# Patient Record
Sex: FEMALE | Race: WHITE | NOT HISPANIC OR LATINO | Employment: OTHER | URBAN - METROPOLITAN AREA
[De-identification: names, ages, dates, MRNs, and addresses within clinical notes are randomized per-mention and may not be internally consistent; named-entity substitution may affect disease eponyms.]

---

## 2017-11-14 ENCOUNTER — HISTORICAL (OUTPATIENT)
Dept: RADIOLOGY | Facility: HOSPITAL | Age: 72
End: 2017-11-14

## 2017-12-01 ENCOUNTER — HISTORICAL (OUTPATIENT)
Dept: RADIOLOGY | Facility: HOSPITAL | Age: 72
End: 2017-12-01

## 2018-11-16 ENCOUNTER — HISTORICAL (OUTPATIENT)
Dept: RADIOLOGY | Facility: HOSPITAL | Age: 73
End: 2018-11-16

## 2020-06-25 ENCOUNTER — HISTORICAL (OUTPATIENT)
Dept: RADIOLOGY | Facility: HOSPITAL | Age: 75
End: 2020-06-25

## 2021-07-06 LAB
ABS NEUT (OLG): 9.4 X10(3)/MCL (ref 1.5–6.9)
APPEARANCE, UA: CLEAR
BACTERIA SPEC CULT: NORMAL /HPF
BILIRUB UR QL STRIP: NEGATIVE
BUN SERPL-MCNC: 36 MG/DL (ref 9.8–20.1)
CALCIUM SERPL-MCNC: 9.7 MG/DL (ref 8.4–10.2)
CHLORIDE SERPL-SCNC: 99 MMOL/L (ref 98–107)
CO2 SERPL-SCNC: 24 MMOL/L (ref 23–31)
COLOR UR: YELLOW
CREAT SERPL-MCNC: 0.99 MG/DL (ref 0.55–1.02)
CREAT/UREA NIT SERPL: 36
ERYTHROCYTE [DISTWIDTH] IN BLOOD BY AUTOMATED COUNT: 13.6 % (ref 11.5–17)
GLUCOSE (UA): NEGATIVE MG/DL
GLUCOSE SERPL-MCNC: 88 MG/DL (ref 82–115)
HCT VFR BLD AUTO: 38 % (ref 36–48)
HGB BLD-MCNC: 12.2 GM/DL (ref 12–16)
HGB UR QL STRIP: NEGATIVE
KETONES UR QL STRIP: NEGATIVE MG/DL
LEUKOCYTE ESTERASE UR QL STRIP: ABNORMAL
MCH RBC QN AUTO: 28 PG (ref 27–34)
MCHC RBC AUTO-ENTMCNC: 32 GM/DL (ref 31–36)
MCV RBC AUTO: 88 FL (ref 80–99)
NITRITE UR QL STRIP: NEGATIVE
PH UR STRIP: 6.5 [PH] (ref 4.6–8)
PLATELET # BLD AUTO: 371 X10(3)/MCL (ref 140–400)
PMV BLD AUTO: 10.7 FL (ref 6.8–10)
POTASSIUM SERPL-SCNC: 4.9 MMOL/L (ref 3.5–5.1)
PROT UR QL STRIP: NEGATIVE MG/DL
RBC # BLD AUTO: 4.31 X10(6)/MCL (ref 4.2–5.4)
RBC #/AREA URNS HPF: NORMAL /[HPF]
SODIUM SERPL-SCNC: 133 MMOL/L (ref 136–145)
SP GR UR STRIP: 1.01 (ref 1–1.03)
SQUAMOUS EPITHELIAL, UA: NORMAL /LPF
UROBILINOGEN UR STRIP-ACNC: 0.2 EU/DL
WBC # SPEC AUTO: 12.2 X10(3)/MCL (ref 4.5–11.5)
WBC #/AREA URNS HPF: NORMAL /HPF

## 2021-07-08 ENCOUNTER — HISTORICAL (OUTPATIENT)
Dept: ANESTHESIOLOGY | Facility: HOSPITAL | Age: 76
End: 2021-07-08

## 2021-08-09 ENCOUNTER — HISTORICAL (OUTPATIENT)
Dept: RADIOLOGY | Facility: HOSPITAL | Age: 76
End: 2021-08-09

## 2021-11-22 ENCOUNTER — HISTORICAL (OUTPATIENT)
Dept: RADIOLOGY | Facility: HOSPITAL | Age: 76
End: 2021-11-22

## 2021-12-20 ENCOUNTER — HISTORICAL (OUTPATIENT)
Dept: RADIOLOGY | Facility: HOSPITAL | Age: 76
End: 2021-12-20

## 2022-05-12 ENCOUNTER — HOSPITAL ENCOUNTER (OUTPATIENT)
Dept: RADIOLOGY | Facility: HOSPITAL | Age: 77
Discharge: HOME | End: 2022-05-12
Attending: NURSE PRACTITIONER
Payer: MEDICARE

## 2022-05-12 DIAGNOSIS — M79.671 RIGHT FOOT PAIN: ICD-10-CM

## 2022-05-12 PROCEDURE — 73630 X-RAY EXAM OF FOOT: CPT | Mod: TC,RT

## 2022-06-02 ENCOUNTER — HOSPITAL ENCOUNTER (OUTPATIENT)
Dept: RADIOLOGY | Facility: HOSPITAL | Age: 77
Discharge: HOME | End: 2022-06-02
Attending: SPECIALIST
Payer: MEDICARE

## 2022-06-02 DIAGNOSIS — M79.671 RIGHT FOOT PAIN: ICD-10-CM

## 2022-06-02 PROCEDURE — 73630 X-RAY EXAM OF FOOT: CPT | Mod: TC,RT

## 2022-11-03 DIAGNOSIS — Z12.31 ENCOUNTER FOR SCREENING MAMMOGRAM FOR MALIGNANT NEOPLASM OF BREAST: Primary | ICD-10-CM

## 2022-11-29 ENCOUNTER — HOSPITAL ENCOUNTER (OUTPATIENT)
Dept: RADIOLOGY | Facility: HOSPITAL | Age: 77
Discharge: HOME | End: 2022-11-29
Attending: NURSE PRACTITIONER
Payer: MEDICARE

## 2022-11-29 DIAGNOSIS — Z12.31 ENCOUNTER FOR SCREENING MAMMOGRAM FOR MALIGNANT NEOPLASM OF BREAST: ICD-10-CM

## 2022-11-29 PROCEDURE — 77063 BREAST TOMOSYNTHESIS BI: CPT | Mod: TC

## 2022-11-29 PROCEDURE — 77067 SCR MAMMO BI INCL CAD: CPT | Mod: 26,,, | Performed by: STUDENT IN AN ORGANIZED HEALTH CARE EDUCATION/TRAINING PROGRAM

## 2022-11-29 PROCEDURE — 77063 MAMMO DIGITAL SCREENING BILAT WITH TOMO: ICD-10-PCS | Mod: 26,,, | Performed by: STUDENT IN AN ORGANIZED HEALTH CARE EDUCATION/TRAINING PROGRAM

## 2022-11-29 PROCEDURE — 77067 MAMMO DIGITAL SCREENING BILAT WITH TOMO: ICD-10-PCS | Mod: 26,,, | Performed by: STUDENT IN AN ORGANIZED HEALTH CARE EDUCATION/TRAINING PROGRAM

## 2022-11-29 PROCEDURE — 77063 BREAST TOMOSYNTHESIS BI: CPT | Mod: 26,,, | Performed by: STUDENT IN AN ORGANIZED HEALTH CARE EDUCATION/TRAINING PROGRAM

## 2022-11-29 PROCEDURE — 77067 SCR MAMMO BI INCL CAD: CPT | Mod: TC

## 2023-01-10 DIAGNOSIS — Z80.3 FAMILY HISTORY OF BREAST CANCER: Primary | ICD-10-CM

## 2023-03-07 ENCOUNTER — OFFICE VISIT (OUTPATIENT)
Dept: HEMATOLOGY/ONCOLOGY | Facility: CLINIC | Age: 78
End: 2023-03-07
Payer: MEDICARE

## 2023-03-07 DIAGNOSIS — Z15.09 MONOALLELIC MUTATION OF CHEK2 GENE IN FEMALE PATIENT: Primary | ICD-10-CM

## 2023-03-07 DIAGNOSIS — Z15.01 MONOALLELIC MUTATION OF CHEK2 GENE IN FEMALE PATIENT: Primary | ICD-10-CM

## 2023-03-07 DIAGNOSIS — Z15.02 MONOALLELIC MUTATION OF CHEK2 GENE IN FEMALE PATIENT: Primary | ICD-10-CM

## 2023-03-07 DIAGNOSIS — Z15.89 MONOALLELIC MUTATION OF CHEK2 GENE IN FEMALE PATIENT: Primary | ICD-10-CM

## 2023-03-07 PROCEDURE — 99213 PR OFFICE/OUTPT VISIT, EST, LEVL III, 20-29 MIN: ICD-10-PCS | Mod: 95,,, | Performed by: NURSE PRACTITIONER

## 2023-03-07 PROCEDURE — 99213 OFFICE O/P EST LOW 20 MIN: CPT | Mod: 95,,, | Performed by: NURSE PRACTITIONER

## 2023-03-07 NOTE — PROGRESS NOTES
REFERRING PHYSICIAN: Self    Subjective:       Patient ID: Elvira Rivers is a 77 y.o. female.    Chief Complaint: No personal history of cancer but a family history of cancer and a known CHEK2 mutation in her sister, Katy Gonzales  53. Patient presented with her sister for genetic counseling when the sister's genetic test result was revealed. Ms. Rivers was found appropriate for genetic testing. She signed consent, lab was drawn and sent to [facility] for [facility] panel testing. She presented via Telemedicine Visit (audio and video) today for results disclosure.     HPI  No past medical history on file.     Review of patient's allergies indicates:  No Known Allergies     Review of Systems   Constitutional:  Negative for appetite change and unexpected weight change.   HENT:  Negative for mouth sores.    Eyes:  Negative for visual disturbance.   Respiratory:  Negative for cough and shortness of breath.    Cardiovascular:  Negative for chest pain.   Gastrointestinal:  Negative for abdominal pain and diarrhea.   Genitourinary:  Negative for frequency.   Musculoskeletal:  Negative for back pain.   Integumentary:  Negative for rash.   Neurological:  Negative for headaches.   Hematological:  Negative for adenopathy.   Psychiatric/Behavioral:  The patient is nervous/anxious.              Problem List Items Addressed This Visit    None  Visit Diagnoses       Monoallelic mutation of CHEK2 gene in female patient    -  Primary          Oncology History    No history exists.          Family History   Problem Relation Age of Onset    Osteoarthritis Mother     Heart failure Mother     Hypertension Mother     Arthritis Mother     Hearing loss Mother     Heart disease Mother     Miscarriages / Stillbirths Mother     Vision loss Mother         Macular Degeneration    Melanoma Mother 70    Prostate cancer Father 70        metastatic    Heart failure Father     Arthritis Father     Asthma Sister     Thyroid  disease Sister     Thyroid disease Sister     Breast cancer Sister 69    Genetic Disorder Sister         CHEK2 mutation    Throat cancer Brother 62        Squamous Cell Ca of tonsil    Hearing loss Brother     Heart disease Brother     Skin cancer Brother     Breast cancer Maternal Grandmother     Diabetes Maternal Grandmother     Heart failure Maternal Grandmother     Heart disease Maternal Grandmother     Leukemia Maternal Grandfather     Breast cancer Paternal Grandmother     Heart failure Paternal Grandmother     Cancer Paternal Grandmother     Heart disease Paternal Grandmother     Miscarriages / Stillbirths Paternal Grandmother     Heart failure Paternal Grandfather     Heart disease Paternal Grandfather     Hearing loss Maternal Uncle     Stroke Paternal Aunt     Hearing loss Paternal Aunt         Deaf mute    Hearing loss Paternal Uncle         Deaf mute    Hearing loss Paternal Uncle     Brain cancer Maternal Cousin 66        Assessment:     Genetic testing was appropriate for this patient because of her personal and family history. This CHEK2 specific site analysis indicated the heterozygous mutation genetic mutation: CHEK2 c.1100delC (p.C205Pog*15). This CHEK2 mutation is associated with increased lifetime risk of female breast cancer (20-40%) and colon cancer (5-10%).     The c.1100delc pathogenic mutation, located in coding exon 10 of the CHEK2 gene, results from a deletion of one nucleotide at position 1100, causing a translational frameshift with a predicted alternate stop codon. This alteration is located within the kinase domain, and is reported to abolish the kinase activity of CHEK2. This alteration is expected to result in loss of function by premature protein truncation or nonsense-mediated mRNA decay. As such, this alteration is interpreted as a disease-causing mutation.      The CHEK2 gene provides instructions for making checkpoint kinase 2 (CHEK2). This protein acts as a tumor suppressor  that regulates cell division by keeping cells from growing and dividing too rapidly or in an uncontrolled way. In response to DNA damage, the CHK2 protein interacts with several other proteins, including tumor protein 53 (produced from the TP53 gene) to keep cells with mutated or damaged DNA from dividing, thus preventing tumor formation.      The National Comprehensive Cancer Network (NCCN) has developed guidelines for management of cancer risks for CHEK2 mutation carriers. The options available to the patient were explained to her. The surveillance recommendation for risk of breast cancer is evaluation with mammogram annually with consideration for breast MRI annually. A reasonable schedule would be mammogram alternating breast MRI every 6 months, beginning at 30y or 5-10 years before the earliest case of breast cancer in the family.  For the risk of colon cancer, colonoscopy should begin at 40y or 10 years prior to the first case of colon cancer in the family, and repeated at least every 5 years.      Each first degree relative of Ms. Rivers has a 50% risk of having this same CHEK2 mutation. Family members can be tested using specific site analysis. Her sister was provided with a simple letter of explanation to assist with informing family members.    Daughter, Sharon WHITEHEAD 73 was present through the entire counseling session and has requested testing.     Per patient request a copy of this note and genetic test result will be forwarded to Dr. Olegario Christopher and Aruna Goncalves NP ([facility]).     A copy of her result and a simple letter of explanation to assist with informing family members will be emailed to her: [email or web address]       The patient location is: home    Visit type: audiovisual    Face to Face time with patient: 15 minutes  20 minutes of total time spent on the encounter, which includes face to face time and non-face to face time preparing to see the patient (eg, review of tests),  Obtaining and/or reviewing separately obtained history, Documenting clinical information in the electronic or other health record, Independently interpreting results (not separately reported) and communicating results to the patient/family/caregiver, or Care coordination (not separately reported).         Each patient to whom he or she provides medical services by telemedicine is:  (1) informed of the relationship between the physician and patient and the respective role of any other health care provider with respect to management of the patient; and (2) notified that he or she may decline to receive medical services by telemedicine and may withdraw from such care at any time.    PAMELA DUNAWAY, PhD

## 2023-03-09 ENCOUNTER — HOSPITAL ENCOUNTER (OUTPATIENT)
Dept: RADIOLOGY | Facility: HOSPITAL | Age: 78
Discharge: HOME | End: 2023-03-09
Attending: NURSE PRACTITIONER
Payer: MEDICARE

## 2023-03-09 DIAGNOSIS — M79.671 RIGHT FOOT PAIN: ICD-10-CM

## 2023-03-09 PROCEDURE — 73650 X-RAY EXAM OF HEEL: CPT | Mod: TC,RT

## 2023-03-29 DIAGNOSIS — N18.31 CHRONIC KIDNEY DISEASE, STAGE 3A: Primary | ICD-10-CM

## 2023-04-10 ENCOUNTER — HOSPITAL ENCOUNTER (OUTPATIENT)
Dept: RADIOLOGY | Facility: HOSPITAL | Age: 78
Discharge: HOME | End: 2023-04-10
Payer: MEDICARE

## 2023-04-10 DIAGNOSIS — N18.31 CHRONIC KIDNEY DISEASE, STAGE 3A: ICD-10-CM

## 2023-04-10 PROCEDURE — 76770 US EXAM ABDO BACK WALL COMP: CPT | Mod: TC

## 2023-09-26 ENCOUNTER — HOSPITAL ENCOUNTER (OUTPATIENT)
Dept: RADIOLOGY | Facility: HOSPITAL | Age: 78
Discharge: HOME | End: 2023-09-26
Attending: NURSE PRACTITIONER
Payer: MEDICARE

## 2023-09-26 DIAGNOSIS — M25.512 LEFT SHOULDER PAIN: ICD-10-CM

## 2023-09-26 PROCEDURE — 73030 X-RAY EXAM OF SHOULDER: CPT | Mod: TC,LT

## 2023-12-22 ENCOUNTER — HOSPITAL ENCOUNTER (OUTPATIENT)
Dept: RADIOLOGY | Facility: HOSPITAL | Age: 78
Discharge: HOME | End: 2023-12-22
Attending: NURSE PRACTITIONER
Payer: MEDICARE

## 2023-12-22 DIAGNOSIS — Z12.31 BREAST CANCER SCREENING BY MAMMOGRAM: ICD-10-CM

## 2023-12-22 PROCEDURE — 77067 SCR MAMMO BI INCL CAD: CPT | Mod: 26,,, | Performed by: STUDENT IN AN ORGANIZED HEALTH CARE EDUCATION/TRAINING PROGRAM

## 2023-12-22 PROCEDURE — 77063 BREAST TOMOSYNTHESIS BI: CPT | Mod: 26,,, | Performed by: STUDENT IN AN ORGANIZED HEALTH CARE EDUCATION/TRAINING PROGRAM

## 2023-12-22 PROCEDURE — 77067 SCR MAMMO BI INCL CAD: CPT | Mod: TC

## 2023-12-22 PROCEDURE — 77063 MAMMO DIGITAL SCREENING BILAT WITH TOMO: ICD-10-PCS | Mod: 26,,, | Performed by: STUDENT IN AN ORGANIZED HEALTH CARE EDUCATION/TRAINING PROGRAM

## 2023-12-22 PROCEDURE — 77067 MAMMO DIGITAL SCREENING BILAT WITH TOMO: ICD-10-PCS | Mod: 26,,, | Performed by: STUDENT IN AN ORGANIZED HEALTH CARE EDUCATION/TRAINING PROGRAM

## 2024-08-07 ENCOUNTER — HOSPITAL ENCOUNTER (OUTPATIENT)
Dept: RADIOLOGY | Facility: HOSPITAL | Age: 79
Discharge: HOME | End: 2024-08-07
Attending: NURSE PRACTITIONER
Payer: MEDICARE

## 2024-08-07 DIAGNOSIS — R09.3 ABNORMAL SPUTUM: ICD-10-CM

## 2024-08-07 DIAGNOSIS — R05.9 COUGH, UNSPECIFIED: ICD-10-CM

## 2024-08-07 PROCEDURE — 71046 X-RAY EXAM CHEST 2 VIEWS: CPT | Mod: TC

## 2024-08-14 DIAGNOSIS — R05.9 COUGH: ICD-10-CM

## 2024-08-14 DIAGNOSIS — R91.8 LUNG MASS: ICD-10-CM

## 2024-08-14 DIAGNOSIS — R09.3 ABNORMAL SPUTUM: Primary | ICD-10-CM

## 2024-08-22 ENCOUNTER — HOSPITAL ENCOUNTER (OUTPATIENT)
Dept: RADIOLOGY | Facility: HOSPITAL | Age: 79
Discharge: HOME | End: 2024-08-22
Attending: NURSE PRACTITIONER
Payer: MEDICARE

## 2024-08-22 DIAGNOSIS — R91.8 LUNG MASS: ICD-10-CM

## 2024-08-22 DIAGNOSIS — R09.3 ABNORMAL SPUTUM: ICD-10-CM

## 2024-08-22 DIAGNOSIS — R05.9 COUGH: ICD-10-CM

## 2024-08-22 PROCEDURE — 71250 CT THORAX DX C-: CPT | Mod: TC

## 2024-09-20 DIAGNOSIS — Z12.31 ENCOUNTER FOR SCREENING MAMMOGRAM FOR MALIGNANT NEOPLASM OF BREAST: Primary | ICD-10-CM

## 2024-11-17 ENCOUNTER — HOSPITAL ENCOUNTER (INPATIENT)
Facility: HOSPITAL | Age: 79
LOS: 4 days | Discharge: NURSING, REHAB OR LONG-TERM CARE | DRG: 481 | End: 2024-11-21
Attending: STUDENT IN AN ORGANIZED HEALTH CARE EDUCATION/TRAINING PROGRAM | Admitting: STUDENT IN AN ORGANIZED HEALTH CARE EDUCATION/TRAINING PROGRAM
Payer: MEDICARE

## 2024-11-17 DIAGNOSIS — Z01.811 PRE-OP CHEST EXAM: ICD-10-CM

## 2024-11-17 DIAGNOSIS — S72.352A DISPLACED COMMINUTED FRACTURE OF SHAFT OF LEFT FEMUR, INITIAL ENCOUNTER FOR CLOSED FRACTURE: ICD-10-CM

## 2024-11-17 DIAGNOSIS — S72.302A CLOSED FRACTURE OF SHAFT OF LEFT FEMUR, UNSPECIFIED FRACTURE MORPHOLOGY, INITIAL ENCOUNTER: Primary | ICD-10-CM
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
## 2024-11-17 DIAGNOSIS — R52 PAIN: ICD-10-CM

## 2024-11-17 PROBLEM — S72.92XA CLOSED FRACTURE OF LEFT FEMUR: Status: ACTIVE | Noted: 2024-11-17

## 2024-11-17 LAB
ALBUMIN SERPL-MCNC: 4 G/DL (ref 3.4–4.8)
ALBUMIN/GLOB SERPL: 1.4 RATIO (ref 1.1–2)
ALP SERPL-CCNC: 56 UNIT/L (ref 40–150)
ALT SERPL-CCNC: 13 UNIT/L (ref 0–55)
ANION GAP SERPL CALC-SCNC: 12 MEQ/L
AST SERPL-CCNC: 17 UNIT/L (ref 5–34)
BASOPHILS # BLD AUTO: 0.09 X10(3)/MCL
BASOPHILS NFR BLD AUTO: 0.8 %
BILIRUB SERPL-MCNC: 0.3 MG/DL
BUN SERPL-MCNC: 23.3 MG/DL (ref 9.8–20.1)
CALCIUM SERPL-MCNC: 9.1 MG/DL (ref 8.4–10.2)
CHLORIDE SERPL-SCNC: 101 MMOL/L (ref 98–107)
CO2 SERPL-SCNC: 21 MMOL/L (ref 23–31)
CREAT SERPL-MCNC: 1.41 MG/DL (ref 0.55–1.02)
CREAT/UREA NIT SERPL: 17
EOSINOPHIL # BLD AUTO: 0.03 X10(3)/MCL (ref 0–0.9)
EOSINOPHIL NFR BLD AUTO: 0.3 %
ERYTHROCYTE [DISTWIDTH] IN BLOOD BY AUTOMATED COUNT: 13.1 % (ref 11.5–17)
GFR SERPLBLD CREATININE-BSD FMLA CKD-EPI: 38 ML/MIN/1.73/M2
GLOBULIN SER-MCNC: 2.9 GM/DL (ref 2.4–3.5)
GLUCOSE SERPL-MCNC: 114 MG/DL (ref 82–115)
HCT VFR BLD AUTO: 34.7 % (ref 37–47)
HGB BLD-MCNC: 11.8 G/DL (ref 12–16)
IMM GRANULOCYTES # BLD AUTO: 0.04 X10(3)/MCL (ref 0–0.04)
IMM GRANULOCYTES NFR BLD AUTO: 0.3 %
LYMPHOCYTES # BLD AUTO: 0.94 X10(3)/MCL (ref 0.6–4.6)
LYMPHOCYTES NFR BLD AUTO: 8.1 %
MCH RBC QN AUTO: 29.9 PG (ref 27–31)
MCHC RBC AUTO-ENTMCNC: 34 G/DL (ref 33–36)
MCV RBC AUTO: 88.1 FL (ref 80–94)
MONOCYTES # BLD AUTO: 0.75 X10(3)/MCL (ref 0.1–1.3)
MONOCYTES NFR BLD AUTO: 6.5 %
NEUTROPHILS # BLD AUTO: 9.72 X10(3)/MCL (ref 2.1–9.2)
NEUTROPHILS NFR BLD AUTO: 84 %
NRBC BLD AUTO-RTO: 0 %
PLATELET # BLD AUTO: 299 X10(3)/MCL (ref 130–400)
PMV BLD AUTO: 9.8 FL (ref 7.4–10.4)
POTASSIUM SERPL-SCNC: 4.6 MMOL/L (ref 3.5–5.1)
PROT SERPL-MCNC: 6.9 GM/DL (ref 5.8–7.6)
RBC # BLD AUTO: 3.94 X10(6)/MCL (ref 4.2–5.4)
SODIUM SERPL-SCNC: 134 MMOL/L (ref 136–145)
WBC # BLD AUTO: 11.57 X10(3)/MCL (ref 4.5–11.5)

## 2024-11-17 PROCEDURE — 63600175 PHARM REV CODE 636 W HCPCS

## 2024-11-17 PROCEDURE — 85025 COMPLETE CBC W/AUTO DIFF WBC: CPT | Performed by: STUDENT IN AN ORGANIZED HEALTH CARE EDUCATION/TRAINING PROGRAM

## 2024-11-17 PROCEDURE — 11000001 HC ACUTE MED/SURG PRIVATE ROOM

## 2024-11-17 PROCEDURE — 63600175 PHARM REV CODE 636 W HCPCS: Performed by: STUDENT IN AN ORGANIZED HEALTH CARE EDUCATION/TRAINING PROGRAM

## 2024-11-17 PROCEDURE — 80053 COMPREHEN METABOLIC PANEL: CPT | Performed by: STUDENT IN AN ORGANIZED HEALTH CARE EDUCATION/TRAINING PROGRAM

## 2024-11-17 RX ORDER — ROPINIROLE 3 MG/1
1 TABLET, FILM COATED ORAL NIGHTLY
COMMUNITY
Start: 2024-09-24

## 2024-11-17 RX ORDER — LIDOCAINE HYDROCHLORIDE 10 MG/ML
1 INJECTION, SOLUTION EPIDURAL; INFILTRATION; INTRACAUDAL; PERINEURAL ONCE AS NEEDED
Status: DISCONTINUED | OUTPATIENT
Start: 2024-11-18 | End: 2024-11-21 | Stop reason: HOSPADM

## 2024-11-17 RX ORDER — SIMVASTATIN 20 MG/1
20 TABLET, FILM COATED ORAL
COMMUNITY

## 2024-11-17 RX ORDER — MORPHINE SULFATE 4 MG/ML
4 INJECTION, SOLUTION INTRAMUSCULAR; INTRAVENOUS
Status: COMPLETED | OUTPATIENT
Start: 2024-11-17 | End: 2024-11-17

## 2024-11-17 RX ORDER — ENOXAPARIN SODIUM 100 MG/ML
30 INJECTION SUBCUTANEOUS EVERY 24 HOURS
Status: DISCONTINUED | OUTPATIENT
Start: 2024-11-18 | End: 2024-11-19

## 2024-11-17 RX ORDER — OXYCODONE HYDROCHLORIDE 5 MG/1
5 TABLET ORAL EVERY 4 HOURS PRN
Status: DISCONTINUED | OUTPATIENT
Start: 2024-11-18 | End: 2024-11-21 | Stop reason: HOSPADM

## 2024-11-17 RX ORDER — SODIUM CHLORIDE, SODIUM LACTATE, POTASSIUM CHLORIDE, CALCIUM CHLORIDE 600; 310; 30; 20 MG/100ML; MG/100ML; MG/100ML; MG/100ML
INJECTION, SOLUTION INTRAVENOUS CONTINUOUS
Status: DISCONTINUED | OUTPATIENT
Start: 2024-11-18 | End: 2024-11-19

## 2024-11-17 RX ORDER — HYDROCHLOROTHIAZIDE 12.5 MG/1
12.5 TABLET ORAL DAILY
Status: DISCONTINUED | OUTPATIENT
Start: 2024-11-18 | End: 2024-11-21 | Stop reason: HOSPADM

## 2024-11-17 RX ORDER — MUPIROCIN 20 MG/G
OINTMENT TOPICAL 2 TIMES DAILY
Status: DISCONTINUED | OUTPATIENT
Start: 2024-11-19 | End: 2024-11-21 | Stop reason: HOSPADM

## 2024-11-17 RX ORDER — ACETAMINOPHEN 325 MG/1
650 TABLET ORAL EVERY 4 HOURS PRN
Status: DISCONTINUED | OUTPATIENT
Start: 2024-11-18 | End: 2024-11-19

## 2024-11-17 RX ORDER — ONDANSETRON HYDROCHLORIDE 2 MG/ML
INJECTION, SOLUTION INTRAVENOUS
Status: COMPLETED
Start: 2024-11-17 | End: 2024-11-17

## 2024-11-17 RX ORDER — MORPHINE SULFATE 4 MG/ML
INJECTION, SOLUTION INTRAMUSCULAR; INTRAVENOUS
Status: COMPLETED
Start: 2024-11-17 | End: 2024-11-17

## 2024-11-17 RX ORDER — LEVOTHYROXINE SODIUM 112 UG/1
112 TABLET ORAL DAILY
Status: DISCONTINUED | OUTPATIENT
Start: 2024-11-18 | End: 2024-11-21 | Stop reason: HOSPADM

## 2024-11-17 RX ORDER — ONDANSETRON 4 MG/1
8 TABLET, ORALLY DISINTEGRATING ORAL EVERY 8 HOURS PRN
Status: DISCONTINUED | OUTPATIENT
Start: 2024-11-18 | End: 2024-11-21 | Stop reason: HOSPADM

## 2024-11-17 RX ORDER — PROMETHAZINE HYDROCHLORIDE 25 MG/1
25 TABLET ORAL EVERY 6 HOURS PRN
Status: DISCONTINUED | OUTPATIENT
Start: 2024-11-18 | End: 2024-11-21 | Stop reason: HOSPADM

## 2024-11-17 RX ORDER — ONDANSETRON HYDROCHLORIDE 2 MG/ML
4 INJECTION, SOLUTION INTRAVENOUS
Status: COMPLETED | OUTPATIENT
Start: 2024-11-17 | End: 2024-11-17

## 2024-11-17 RX ORDER — SODIUM CHLORIDE 0.9 % (FLUSH) 0.9 %
10 SYRINGE (ML) INJECTION
Status: DISCONTINUED | OUTPATIENT
Start: 2024-11-18 | End: 2024-11-21 | Stop reason: HOSPADM

## 2024-11-17 RX ORDER — METHOCARBAMOL 100 MG/ML
500 INJECTION, SOLUTION INTRAMUSCULAR; INTRAVENOUS ONCE
Status: COMPLETED | OUTPATIENT
Start: 2024-11-17 | End: 2024-11-17

## 2024-11-17 RX ORDER — PROPOFOL 10 MG/ML
VIAL (ML) INTRAVENOUS CODE/TRAUMA/SEDATION MEDICATION
Status: COMPLETED | OUTPATIENT
Start: 2024-11-17 | End: 2024-11-17

## 2024-11-17 RX ORDER — MORPHINE SULFATE 4 MG/ML
4 INJECTION, SOLUTION INTRAMUSCULAR; INTRAVENOUS
Status: DISCONTINUED | OUTPATIENT
Start: 2024-11-17 | End: 2024-11-17

## 2024-11-17 RX ORDER — HYDROCHLOROTHIAZIDE 12.5 MG/1
12.5 TABLET ORAL
COMMUNITY
Start: 2024-10-23

## 2024-11-17 RX ORDER — OXYCODONE HYDROCHLORIDE 10 MG/1
10 TABLET ORAL EVERY 4 HOURS PRN
Status: DISCONTINUED | OUTPATIENT
Start: 2024-11-18 | End: 2024-11-21 | Stop reason: HOSPADM

## 2024-11-17 RX ORDER — ACETAMINOPHEN 325 MG/1
650 TABLET ORAL EVERY 8 HOURS PRN
Status: DISCONTINUED | OUTPATIENT
Start: 2024-11-18 | End: 2024-11-19

## 2024-11-17 RX ORDER — PROPOFOL 10 MG/ML
100 VIAL (ML) INTRAVENOUS ONCE
Status: DISCONTINUED | OUTPATIENT
Start: 2024-11-17 | End: 2024-11-18 | Stop reason: HOSPADM

## 2024-11-17 RX ORDER — TALC
6 POWDER (GRAM) TOPICAL NIGHTLY PRN
Status: DISCONTINUED | OUTPATIENT
Start: 2024-11-18 | End: 2024-11-21 | Stop reason: HOSPADM

## 2024-11-17 RX ORDER — ROPINIROLE 1 MG/1
3 TABLET, FILM COATED ORAL NIGHTLY
Status: DISCONTINUED | OUTPATIENT
Start: 2024-11-18 | End: 2024-11-18

## 2024-11-17 RX ORDER — LEVOTHYROXINE SODIUM 112 UG/1
112 TABLET ORAL
COMMUNITY

## 2024-11-17 RX ADMIN — MORPHINE SULFATE 4 MG: 4 INJECTION INTRAVENOUS at 08:11

## 2024-11-17 RX ADMIN — ONDANSETRON 4 MG: 2 INJECTION INTRAMUSCULAR; INTRAVENOUS at 10:11

## 2024-11-17 RX ADMIN — PROPOFOL 20 MG: 10 INJECTION, EMULSION INTRAVENOUS at 10:11

## 2024-11-17 RX ADMIN — PROPOFOL 10 MG: 10 INJECTION, EMULSION INTRAVENOUS at 10:11

## 2024-11-17 RX ADMIN — ONDANSETRON HYDROCHLORIDE 4 MG: 2 INJECTION, SOLUTION INTRAVENOUS at 10:11

## 2024-11-17 RX ADMIN — ONDANSETRON 4 MG: 2 INJECTION INTRAMUSCULAR; INTRAVENOUS at 08:11

## 2024-11-17 RX ADMIN — MORPHINE SULFATE 4 MG: 4 INJECTION INTRAVENOUS at 10:11

## 2024-11-17 RX ADMIN — METHOCARBAMOL 500 MG: 100 INJECTION INTRAMUSCULAR; INTRAVENOUS at 10:11

## 2024-11-17 RX ADMIN — MORPHINE SULFATE 4 MG: 4 INJECTION, SOLUTION INTRAMUSCULAR; INTRAVENOUS at 10:11

## 2024-11-18 ENCOUNTER — ANESTHESIA EVENT (OUTPATIENT)
Dept: SURGERY | Facility: HOSPITAL | Age: 79
End: 2024-11-18
Payer: MEDICARE

## 2024-11-18 ENCOUNTER — ANESTHESIA (OUTPATIENT)
Dept: SURGERY | Facility: HOSPITAL | Age: 79
End: 2024-11-18
Payer: MEDICARE

## 2024-11-18 LAB
ABORH RETYPE: NORMAL
ANION GAP SERPL CALC-SCNC: 6 MEQ/L
ANION GAP SERPL CALC-SCNC: 9 MEQ/L
BASOPHILS # BLD AUTO: 0.1 X10(3)/MCL
BASOPHILS NFR BLD AUTO: 0.9 %
BUN SERPL-MCNC: 15.4 MG/DL (ref 9.8–20.1)
BUN SERPL-MCNC: 16.8 MG/DL (ref 9.8–20.1)
CALCIUM SERPL-MCNC: 8.5 MG/DL (ref 8.4–10.2)
CALCIUM SERPL-MCNC: 8.6 MG/DL (ref 8.4–10.2)
CHLORIDE SERPL-SCNC: 102 MMOL/L (ref 98–107)
CHLORIDE SERPL-SCNC: 106 MMOL/L (ref 98–107)
CO2 SERPL-SCNC: 20 MMOL/L (ref 23–31)
CO2 SERPL-SCNC: 25 MMOL/L (ref 23–31)
CREAT SERPL-MCNC: 1.24 MG/DL (ref 0.55–1.02)
CREAT SERPL-MCNC: 1.27 MG/DL (ref 0.55–1.02)
CREAT/UREA NIT SERPL: 12
CREAT/UREA NIT SERPL: 13
EOSINOPHIL # BLD AUTO: 0.03 X10(3)/MCL (ref 0–0.9)
EOSINOPHIL NFR BLD AUTO: 0.3 %
ERYTHROCYTE [DISTWIDTH] IN BLOOD BY AUTOMATED COUNT: 13.3 % (ref 11.5–17)
GFR SERPLBLD CREATININE-BSD FMLA CKD-EPI: 43 ML/MIN/1.73/M2
GFR SERPLBLD CREATININE-BSD FMLA CKD-EPI: 44 ML/MIN/1.73/M2
GLUCOSE SERPL-MCNC: 162 MG/DL (ref 82–115)
GLUCOSE SERPL-MCNC: 97 MG/DL (ref 82–115)
GROUP & RH: NORMAL
HCT VFR BLD AUTO: 32.6 % (ref 37–47)
HGB BLD-MCNC: 10.9 G/DL (ref 12–16)
IMM GRANULOCYTES # BLD AUTO: 0.04 X10(3)/MCL (ref 0–0.04)
IMM GRANULOCYTES NFR BLD AUTO: 0.4 %
INDIRECT COOMBS: NORMAL
LYMPHOCYTES # BLD AUTO: 1.89 X10(3)/MCL (ref 0.6–4.6)
LYMPHOCYTES NFR BLD AUTO: 17.5 %
MAGNESIUM SERPL-MCNC: 2.2 MG/DL (ref 1.6–2.6)
MCH RBC QN AUTO: 29.7 PG (ref 27–31)
MCHC RBC AUTO-ENTMCNC: 33.4 G/DL (ref 33–36)
MCV RBC AUTO: 88.8 FL (ref 80–94)
MONOCYTES # BLD AUTO: 1.1 X10(3)/MCL (ref 0.1–1.3)
MONOCYTES NFR BLD AUTO: 10.2 %
NEUTROPHILS # BLD AUTO: 7.61 X10(3)/MCL (ref 2.1–9.2)
NEUTROPHILS NFR BLD AUTO: 70.7 %
NRBC BLD AUTO-RTO: 0 %
PHOSPHATE SERPL-MCNC: 4 MG/DL (ref 2.3–4.7)
PLATELET # BLD AUTO: 271 X10(3)/MCL (ref 130–400)
PMV BLD AUTO: 10.1 FL (ref 7.4–10.4)
POTASSIUM SERPL-SCNC: 4.3 MMOL/L (ref 3.5–5.1)
POTASSIUM SERPL-SCNC: 5.4 MMOL/L (ref 3.5–5.1)
RBC # BLD AUTO: 3.67 X10(6)/MCL (ref 4.2–5.4)
SODIUM SERPL-SCNC: 131 MMOL/L (ref 136–145)
SODIUM SERPL-SCNC: 137 MMOL/L (ref 136–145)
SPECIMEN OUTDATE: NORMAL
WBC # BLD AUTO: 10.77 X10(3)/MCL (ref 4.5–11.5)

## 2024-11-18 PROCEDURE — 27506 TREATMENT OF THIGH FRACTURE: CPT | Mod: AS,LT,, | Performed by: PHYSICIAN ASSISTANT

## 2024-11-18 PROCEDURE — 37000009 HC ANESTHESIA EA ADD 15 MINS: Performed by: ORTHOPAEDIC SURGERY

## 2024-11-18 PROCEDURE — D9220A PRA ANESTHESIA: Mod: CRNA,,, | Performed by: STUDENT IN AN ORGANIZED HEALTH CARE EDUCATION/TRAINING PROGRAM

## 2024-11-18 PROCEDURE — 99900035 HC TECH TIME PER 15 MIN (STAT)

## 2024-11-18 PROCEDURE — 63600175 PHARM REV CODE 636 W HCPCS: Performed by: ANESTHESIOLOGY

## 2024-11-18 PROCEDURE — 63600175 PHARM REV CODE 636 W HCPCS: Performed by: PHYSICIAN ASSISTANT

## 2024-11-18 PROCEDURE — 37000008 HC ANESTHESIA 1ST 15 MINUTES: Performed by: ORTHOPAEDIC SURGERY

## 2024-11-18 PROCEDURE — 25000003 PHARM REV CODE 250: Performed by: STUDENT IN AN ORGANIZED HEALTH CARE EDUCATION/TRAINING PROGRAM

## 2024-11-18 PROCEDURE — 25000003 PHARM REV CODE 250

## 2024-11-18 PROCEDURE — 0QS936Z REPOSITION LEFT FEMORAL SHAFT WITH INTRAMEDULLARY INTERNAL FIXATION DEVICE, PERCUTANEOUS APPROACH: ICD-10-PCS | Performed by: ORTHOPAEDIC SURGERY

## 2024-11-18 PROCEDURE — 63600175 PHARM REV CODE 636 W HCPCS: Performed by: STUDENT IN AN ORGANIZED HEALTH CARE EDUCATION/TRAINING PROGRAM

## 2024-11-18 PROCEDURE — 63600175 PHARM REV CODE 636 W HCPCS

## 2024-11-18 PROCEDURE — 84100 ASSAY OF PHOSPHORUS: CPT

## 2024-11-18 PROCEDURE — D9220A PRA ANESTHESIA: Mod: ANES,,, | Performed by: STUDENT IN AN ORGANIZED HEALTH CARE EDUCATION/TRAINING PROGRAM

## 2024-11-18 PROCEDURE — 27201423 OPTIME MED/SURG SUP & DEVICES STERILE SUPPLY: Performed by: ORTHOPAEDIC SURGERY

## 2024-11-18 PROCEDURE — 80048 BASIC METABOLIC PNL TOTAL CA: CPT | Performed by: STUDENT IN AN ORGANIZED HEALTH CARE EDUCATION/TRAINING PROGRAM

## 2024-11-18 PROCEDURE — 36000710: Performed by: ORTHOPAEDIC SURGERY

## 2024-11-18 PROCEDURE — 80048 BASIC METABOLIC PNL TOTAL CA: CPT

## 2024-11-18 PROCEDURE — 99222 1ST HOSP IP/OBS MODERATE 55: CPT | Mod: 57,ICN,, | Performed by: ORTHOPAEDIC SURGERY

## 2024-11-18 PROCEDURE — 86901 BLOOD TYPING SEROLOGIC RH(D): CPT | Performed by: ORTHOPAEDIC SURGERY

## 2024-11-18 PROCEDURE — 27506 TREATMENT OF THIGH FRACTURE: CPT | Mod: LT,,, | Performed by: ORTHOPAEDIC SURGERY

## 2024-11-18 PROCEDURE — 94799 UNLISTED PULMONARY SVC/PX: CPT

## 2024-11-18 PROCEDURE — 21400001 HC TELEMETRY ROOM

## 2024-11-18 PROCEDURE — 63600175 PHARM REV CODE 636 W HCPCS: Performed by: ORTHOPAEDIC SURGERY

## 2024-11-18 PROCEDURE — 36000711: Performed by: ORTHOPAEDIC SURGERY

## 2024-11-18 PROCEDURE — 36415 COLL VENOUS BLD VENIPUNCTURE: CPT

## 2024-11-18 PROCEDURE — C1713 ANCHOR/SCREW BN/BN,TIS/BN: HCPCS | Performed by: ORTHOPAEDIC SURGERY

## 2024-11-18 PROCEDURE — 27000221 HC OXYGEN, UP TO 24 HOURS

## 2024-11-18 PROCEDURE — 36415 COLL VENOUS BLD VENIPUNCTURE: CPT | Performed by: STUDENT IN AN ORGANIZED HEALTH CARE EDUCATION/TRAINING PROGRAM

## 2024-11-18 PROCEDURE — 71000033 HC RECOVERY, INTIAL HOUR: Performed by: ORTHOPAEDIC SURGERY

## 2024-11-18 PROCEDURE — 85025 COMPLETE CBC W/AUTO DIFF WBC: CPT

## 2024-11-18 PROCEDURE — C1769 GUIDE WIRE: HCPCS | Performed by: ORTHOPAEDIC SURGERY

## 2024-11-18 PROCEDURE — 83735 ASSAY OF MAGNESIUM: CPT

## 2024-11-18 DEVICE — NAIL IM CANN 130 DEG 11X360 L: Type: IMPLANTABLE DEVICE | Site: FEMUR | Status: FUNCTIONAL

## 2024-11-18 DEVICE — SCREW XL25 IM NAIL 38X5MM: Type: IMPLANTABLE DEVICE | Site: FEMUR | Status: FUNCTIONAL

## 2024-11-18 DEVICE — BLADE HELICAL PERF GOLD 85MM: Type: IMPLANTABLE DEVICE | Site: FEMUR | Status: FUNCTIONAL

## 2024-11-18 DEVICE — SCREW BONE LOCK IM NAIL 34X5MM: Type: IMPLANTABLE DEVICE | Site: FEMUR | Status: FUNCTIONAL

## 2024-11-18 RX ORDER — FENTANYL CITRATE 50 UG/ML
INJECTION, SOLUTION INTRAMUSCULAR; INTRAVENOUS
Status: DISCONTINUED | OUTPATIENT
Start: 2024-11-18 | End: 2024-11-18

## 2024-11-18 RX ORDER — CEFAZOLIN SODIUM 2 G/50ML
2 SOLUTION INTRAVENOUS
Status: DISCONTINUED | OUTPATIENT
Start: 2024-11-18 | End: 2024-11-18 | Stop reason: HOSPADM

## 2024-11-18 RX ORDER — ROCURONIUM BROMIDE 10 MG/ML
INJECTION, SOLUTION INTRAVENOUS
Status: DISCONTINUED | OUTPATIENT
Start: 2024-11-18 | End: 2024-11-18

## 2024-11-18 RX ORDER — ROPINIROLE 1 MG/1
3 TABLET, FILM COATED ORAL NIGHTLY
Status: DISCONTINUED | OUTPATIENT
Start: 2024-11-18 | End: 2024-11-20

## 2024-11-18 RX ORDER — DEXAMETHASONE SODIUM PHOSPHATE 4 MG/ML
INJECTION, SOLUTION INTRA-ARTICULAR; INTRALESIONAL; INTRAMUSCULAR; INTRAVENOUS; SOFT TISSUE
Status: DISCONTINUED | OUTPATIENT
Start: 2024-11-18 | End: 2024-11-18

## 2024-11-18 RX ORDER — CEFAZOLIN 2 G/1
2 INJECTION, POWDER, FOR SOLUTION INTRAMUSCULAR; INTRAVENOUS
Status: DISPENSED | OUTPATIENT
Start: 2024-11-18 | End: 2024-11-19

## 2024-11-18 RX ORDER — LIDOCAINE HYDROCHLORIDE 20 MG/ML
INJECTION INTRAVENOUS
Status: DISCONTINUED | OUTPATIENT
Start: 2024-11-18 | End: 2024-11-18

## 2024-11-18 RX ORDER — METHOCARBAMOL 500 MG/1
500 TABLET, FILM COATED ORAL 3 TIMES DAILY
Status: DISCONTINUED | OUTPATIENT
Start: 2024-11-18 | End: 2024-11-21 | Stop reason: HOSPADM

## 2024-11-18 RX ORDER — ACETAMINOPHEN 10 MG/ML
INJECTION, SOLUTION INTRAVENOUS
Status: DISCONTINUED | OUTPATIENT
Start: 2024-11-18 | End: 2024-11-18

## 2024-11-18 RX ORDER — SODIUM CHLORIDE 0.9 % (FLUSH) 0.9 %
10 SYRINGE (ML) INJECTION
Status: DISCONTINUED | OUTPATIENT
Start: 2024-11-18 | End: 2024-11-18 | Stop reason: HOSPADM

## 2024-11-18 RX ORDER — PROPOFOL 10 MG/ML
VIAL (ML) INTRAVENOUS
Status: DISCONTINUED | OUTPATIENT
Start: 2024-11-18 | End: 2024-11-18

## 2024-11-18 RX ORDER — POLYETHYLENE GLYCOL 3350 17 G/17G
17 POWDER, FOR SOLUTION ORAL DAILY
Status: DISCONTINUED | OUTPATIENT
Start: 2024-11-18 | End: 2024-11-20

## 2024-11-18 RX ORDER — VANCOMYCIN HYDROCHLORIDE 1 G/20ML
INJECTION, POWDER, LYOPHILIZED, FOR SOLUTION INTRAVENOUS
Status: DISCONTINUED | OUTPATIENT
Start: 2024-11-18 | End: 2024-11-18 | Stop reason: HOSPADM

## 2024-11-18 RX ORDER — HYDROMORPHONE HYDROCHLORIDE 2 MG/ML
0.4 INJECTION, SOLUTION INTRAMUSCULAR; INTRAVENOUS; SUBCUTANEOUS EVERY 5 MIN PRN
Status: DISCONTINUED | OUTPATIENT
Start: 2024-11-18 | End: 2024-11-18 | Stop reason: HOSPADM

## 2024-11-18 RX ORDER — GLUCAGON 1 MG
1 KIT INJECTION
Status: DISCONTINUED | OUTPATIENT
Start: 2024-11-18 | End: 2024-11-18 | Stop reason: HOSPADM

## 2024-11-18 RX ORDER — ONDANSETRON HYDROCHLORIDE 2 MG/ML
INJECTION, SOLUTION INTRAVENOUS
Status: DISCONTINUED | OUTPATIENT
Start: 2024-11-18 | End: 2024-11-18

## 2024-11-18 RX ORDER — MORPHINE SULFATE 4 MG/ML
2 INJECTION, SOLUTION INTRAMUSCULAR; INTRAVENOUS EVERY 4 HOURS PRN
Status: DISCONTINUED | OUTPATIENT
Start: 2024-11-18 | End: 2024-11-18

## 2024-11-18 RX ORDER — MORPHINE SULFATE 4 MG/ML
4 INJECTION, SOLUTION INTRAMUSCULAR; INTRAVENOUS EVERY 6 HOURS PRN
Status: ACTIVE | OUTPATIENT
Start: 2024-11-18 | End: 2024-11-20

## 2024-11-18 RX ADMIN — HYDROCHLOROTHIAZIDE 12.5 MG: 12.5 TABLET ORAL at 11:11

## 2024-11-18 RX ADMIN — DEXAMETHASONE SODIUM PHOSPHATE 4 MG: 4 INJECTION, SOLUTION INTRA-ARTICULAR; INTRALESIONAL; INTRAMUSCULAR; INTRAVENOUS; SOFT TISSUE at 09:11

## 2024-11-18 RX ADMIN — SODIUM CHLORIDE, POTASSIUM CHLORIDE, SODIUM LACTATE AND CALCIUM CHLORIDE: 600; 310; 30; 20 INJECTION, SOLUTION INTRAVENOUS at 01:11

## 2024-11-18 RX ADMIN — FENTANYL CITRATE 50 MCG: 50 INJECTION, SOLUTION INTRAMUSCULAR; INTRAVENOUS at 09:11

## 2024-11-18 RX ADMIN — HYDROMORPHONE HYDROCHLORIDE 0.4 MG: 2 INJECTION INTRAMUSCULAR; INTRAVENOUS; SUBCUTANEOUS at 10:11

## 2024-11-18 RX ADMIN — SUGAMMADEX 200 MG: 100 INJECTION, SOLUTION INTRAVENOUS at 09:11

## 2024-11-18 RX ADMIN — METHOCARBAMOL 500 MG: 500 TABLET ORAL at 08:11

## 2024-11-18 RX ADMIN — ONDANSETRON 4 MG: 2 INJECTION INTRAMUSCULAR; INTRAVENOUS at 09:11

## 2024-11-18 RX ADMIN — ENOXAPARIN SODIUM 30 MG: 30 INJECTION SUBCUTANEOUS at 04:11

## 2024-11-18 RX ADMIN — MORPHINE SULFATE 2 MG: 4 INJECTION INTRAVENOUS at 04:11

## 2024-11-18 RX ADMIN — ROPINIROLE HYDROCHLORIDE 3 MG: 1 TABLET, FILM COATED ORAL at 12:11

## 2024-11-18 RX ADMIN — OXYCODONE HYDROCHLORIDE 10 MG: 10 TABLET ORAL at 12:11

## 2024-11-18 RX ADMIN — ROCURONIUM BROMIDE 70 MG: 10 SOLUTION INTRAVENOUS at 09:11

## 2024-11-18 RX ADMIN — Medication 6 MG: at 09:11

## 2024-11-18 RX ADMIN — CEFAZOLIN 2 G: 2 INJECTION, POWDER, FOR SOLUTION INTRAMUSCULAR; INTRAVENOUS at 04:11

## 2024-11-18 RX ADMIN — LEVOTHYROXINE SODIUM 112 MCG: 112 TABLET ORAL at 11:11

## 2024-11-18 RX ADMIN — LIDOCAINE HYDROCHLORIDE 80 MG: 20 INJECTION INTRAVENOUS at 09:11

## 2024-11-18 RX ADMIN — OXYCODONE HYDROCHLORIDE 5 MG: 5 TABLET ORAL at 04:11

## 2024-11-18 RX ADMIN — OXYCODONE HYDROCHLORIDE 5 MG: 5 TABLET ORAL at 09:11

## 2024-11-18 RX ADMIN — METHOCARBAMOL 500 MG: 500 TABLET ORAL at 11:11

## 2024-11-18 RX ADMIN — PROPOFOL 100 MG: 10 INJECTION, EMULSION INTRAVENOUS at 09:11

## 2024-11-18 RX ADMIN — METHOCARBAMOL 500 MG: 500 TABLET ORAL at 01:11

## 2024-11-18 RX ADMIN — ROPINIROLE HYDROCHLORIDE 3 MG: 1 TABLET, FILM COATED ORAL at 08:11

## 2024-11-18 RX ADMIN — ACETAMINOPHEN 1000 MG: 10 INJECTION, SOLUTION INTRAVENOUS at 09:11

## 2024-11-18 RX ADMIN — CEFAZOLIN SODIUM 2 G: 2 SOLUTION INTRAVENOUS at 09:11

## 2024-11-18 RX ADMIN — SODIUM CHLORIDE: 9 INJECTION, SOLUTION INTRAVENOUS at 09:11

## 2024-11-18 NOTE — TRANSFER OF CARE
"Anesthesia Transfer of Care Note    Patient: Elvira Rivers    Procedure(s) Performed: Procedure(s) (LRB):  INSERTION, INTRAMEDULLARY HANK, FEMUR (Left)    Patient location: PACU    Anesthesia Type: general    Transport from OR: Transported from OR on room air with adequate spontaneous ventilation    Post pain: adequate analgesia    Post assessment: no apparent anesthetic complications    Post vital signs: stable    Level of consciousness: sedated    Nausea/Vomiting: no nausea/vomiting    Complications: none    Transfer of care protocol was followed      Last vitals: Visit Vitals  BP (!) 125/54 (BP Location: Left arm, Patient Position: Lying)   Pulse 70   Temp 36.9 °C (98.4 °F) (Oral)   Resp 18   Ht 5' 3" (1.6 m)   Wt 58.5 kg (129 lb)   SpO2 99%   BMI 22.85 kg/m²     "

## 2024-11-18 NOTE — ED PROVIDER NOTES
Encounter Date: 11/17/2024    SCRIBE #1 NOTE: I, Dillan Barrera, am scribing for, and in the presence of,  Nick Turcios MD. I have scribed the following portions of the note - Other sections scribed: HPI,ROS,PE.       History     Chief Complaint   Patient presents with    Leg Pain     Pt arrives via [facility], pt fell, no head strike, - Blood thinners, Left lower ext shortened and externally rotated, deformity, + PMS     80 y/o female with PMHx of CKD and asthma presents to ED via EMS c/o left leg pain. EMS reports pt was in the bathroom, when she slipped, and fell on her left leg. EMS denies head trauma or LOC. EMS denies blood thinners. EMS reports GCS 15 en route. EMS reports heart rate 80s and -150s. EMS reports giving 100 mcg fentanyl en route to ED.     Pt complains of left leg pain. Pt denies head trauma or blood thinners. She denies any head, neck, or back pain.     The history is provided by the patient and the EMS personnel. No  was used.     Review of patient's allergies indicates:  No Known Allergies  No past medical history on file.  No past surgical history on file.  Family History   Problem Relation Name Age of Onset    Osteoarthritis Mother Largo Jose Alfredo'     Heart failure Mother Largo Jose Alfredo'     Hypertension Mother Kelli Jose Alfredo'     Arthritis Mother Kelli Jose Alfredo'     Hearing loss Mother Kelli Jose Alfredo'     Heart disease Mother Kelli Jose Alfredo'     Miscarriages / Stillbirths Mother Kelli Jose Alfredo'     Vision loss Mother Largo Jose Alfredo'         Macular Degeneration    Melanoma Mother Largo Dupre' 70    Prostate cancer Father Donnelly Dupre' 70        metastatic    Heart failure Father Donnelly Jose Alfredo'     Arthritis Father Donnelly Jose Alfredo'     Asthma Sister Elvira Duplechain     Thyroid disease Sister Elvira Duplechain     Thyroid disease Sister Maitlde Becky     Breast cancer Sister Katy 69    Genetic Disorder Sister Katy         CHEK2 mutation    Throat cancer Brother Fausto Jose Alfredo' 62         Squamous Cell Ca of tonsil    Hearing loss Brother Fausto Cadenare'     Heart disease Brother Fausto Cadenare'     Skin cancer Brother Fausto Cadenare'     Breast cancer Maternal Grandmother Veronica Aron     Diabetes Maternal Grandmother Veronica Aron     Heart failure Maternal Grandmother Veronica Aron     Heart disease Maternal Grandmother Veronica Aron     Leukemia Maternal Grandfather      Breast cancer Paternal Grandmother Selema Jose Alfredo'     Heart failure Paternal Grandmother Selema Jose Alfredo'     Cancer Paternal Grandmother Selema Jose Alfredo'     Heart disease Paternal Grandmother Selema Jose Alfredo'     Miscarriages / Stillbirths Paternal Grandmother Selpattie Jose Alrfedo'     Heart failure Paternal Grandfather Mendoza Jose Alfredo'     Heart disease Paternal Grandfather Mendoza Jose Alfredo'     Hearing loss Maternal Uncle SUSIE Aron     Stroke Paternal Aunt Wileyaviva Cadenare'     Hearing loss Paternal Aunt Daisy Cadenare'         Deaf mute    Hearing loss Paternal Uncle Jay Veliz'         Deaf mute    Hearing loss Paternal Uncle Darrell Veliz'     Brain cancer Maternal Cousin  66        Review of Systems   Constitutional:  Negative for fever.   HENT:  Negative for sore throat.    Eyes:  Negative for visual disturbance.   Respiratory:  Negative for shortness of breath.    Cardiovascular:  Negative for chest pain.   Gastrointestinal:  Negative for abdominal pain.   Genitourinary:  Negative for dysuria.   Musculoskeletal:  Positive for arthralgias (left leg pain) and myalgias (left leg pain). Negative for back pain, joint swelling and neck pain.   Skin:  Negative for rash.   Neurological:  Negative for weakness and headaches.   Psychiatric/Behavioral:  Negative for confusion.        Physical Exam     Initial Vitals [11/17/24 1909]   BP Pulse Resp Temp SpO2   (!) 155/66 85 15 97.2 °F (36.2 °C) 95 %      MAP       --         Physical Exam    Nursing note and vitals reviewed.  Constitutional: She appears well-developed and well-nourished.   HENT:   Head:  "Normocephalic and atraumatic.   Eyes: EOM are normal. Pupils are equal, round, and reactive to light.   Neck:   Normal range of motion.  Cardiovascular:  Normal rate, regular rhythm, normal heart sounds and intact distal pulses.           No murmur heard.  Pulmonary/Chest: Breath sounds normal. No respiratory distress. She has no wheezes. She has no rales.   Abdominal: Abdomen is soft. She exhibits no distension. There is no abdominal tenderness. There is no rebound.   Musculoskeletal:         General: Tenderness (tenderness over left thigh) present. No edema.      Cervical back: Normal range of motion.      Comments: Obvious deformity to left thigh.   LLE is shortened and externally rotated.      Neurological: She is alert. She has normal strength. No cranial nerve deficit. GCS score is 15.   Skin: Skin is warm and dry. Capillary refill takes less than 2 seconds. No rash noted. No erythema.   Psychiatric: She has a normal mood and affect.         ED Course   Procedural Sedation        Date/Time: 2024 10:21 PM    Performed by: Nick Turcios MD  Authorized by: Nick Turcios MD  Consent Done: Yes  Consent: Verbal consent obtained. Written consent obtained.  Risks and benefits: risks, benefits and alternatives were discussed  Consent given by: patient  Patient understanding: patient states understanding of the procedure being performed  Patient consent: the patient's understanding of the procedure matches consent given  Procedure consent: procedure consent matches procedure scheduled  Relevant documents: relevant documents present and verified  Site marked: the operative site was marked  Imaging studies: imaging studies available  Patient identity confirmed: , provided demographic data, MRN, verbally with patient and name  Time out: Immediately prior to procedure a "time out" was called to verify the correct patient, procedure, equipment, support staff and site/side marked as required.  ASA Class: Class " 2 - Mild Illness without functional impairment.  Mallampati Score: Class 1 - Visualization of the soft palate, fauces, uvula, and anterior/posterior pillars.     Equipment: on cardiac monitor., on supplemental oxygen., suction available., on CO2 monitor., airway equipment available. and on BP monitor.     Sedation type: moderate (conscious) sedation    Sedatives: propofol  Sedation start date/time: 2024 10:21 PM  Sedation end date/time: 2024 10:41 PM  Total Sedation Time (min): 20  Vitals: Vital signs were monitored during sedation.  Complications: No complications.   Patient/Family history of anesthesia or sedation complications: No    Orthopedic Injury    Date/Time: 2024 10:21 PM    Performed by: Nick Turcios MD  Authorized by: Nick Turcios MD    Location procedure was performed:  [facility] EMERGENCY DEPARTMENT  Consent Done?:  Yes  Universal Protocol:     Verbal consent obtained?: Yes      Written consent obtained?: Yes      Risks and benefits: Risks, benefits and alternatives were discussed      Consent given by:  Patient    Patient states understanding of procedure being performed: Yes      Patient's understanding of procedure matches consent: Yes      Procedure consent matches procedure scheduled: Yes      Relevant documents present and verified: Yes      Site marked: Yes      Imaging studies available: Yes      Patient identity confirmed:  , name, verbally with patient, MRN and provided demographic data    Time Out: Immediately prior to the procedure a time out was called    Injury:     Injury location:  Upper leg    Location details:  Left upper leg    Injury type:  Fracture    Fracture type: femoral shaft        Pre-procedure assessment:     Neurovascular status: Neurovascularly intact      Distal perfusion: normal      Neurological function: normal      Range of motion: reduced      Patient sedated?: Yes      ASA Class:  Class 2 - Mild Illness without functional impairment.     Mallampati Score:  Class 1 - Visualization of the soft palate, fauces, uvula, and anterior/posterior pillars.    Patient/Family history of anesthesia or sedation complications: No      Sedation type: moderate (conscious) sedation      Sedation:  Propofol    Sedation start:  2024 10:21 PM    Sedation end:  2024 10:41 PM    Vital signs: Vital signs monitored during sedation        Selections made in this section will also lock the Injury type section above.:     Manipulation performed?: Yes      Skeletal traction used?: Yes      Reduction successful?: Yes      Confirmation: Reduction confirmed by x-ray      MAST used?: No      Immobilization: knee immobilizer.    Complications: No      Specimens: No      Implants: No    Post-procedure assessment:     Neurovascular status: Neurovascularly intact      Distal perfusion: normal      Neurological function: normal      Range of motion: splinted      Patient tolerance:  Patient tolerated the procedure well with no immediate complications  Splint Application    Date/Time: 2024 10:21 PM    Performed by: Nick Turcios MD  Authorized by: Nick Turcios MD  Consent Done: Yes  Consent: Verbal consent obtained. Written consent obtained.  Risks and benefits: risks, benefits and alternatives were discussed  Consent given by: patient  Patient understanding: patient states understanding of the procedure being performed  Patient consent: the patient's understanding of the procedure matches consent given  Procedure consent: procedure consent matches procedure scheduled  Relevant documents: relevant documents present and verified  Site marked: the operative site was marked  Imaging studies: imaging studies available  Required items: required blood products, implants, devices, and special equipment available  Patient identity confirmed: LAVELL WHITEHEAD, provided demographic data, name and verbally with patient  Location details: left leg  Splint type: knee  immobilizer.  Post-procedure: The splinted body part was neurovascularly unchanged following the procedure.  Patient tolerance: Patient tolerated the procedure well with no immediate complications        Labs Reviewed   COMPREHENSIVE METABOLIC PANEL   CBC W/ AUTO DIFFERENTIAL    Narrative:     The following orders were created for panel order CBC auto differential.  Procedure                               Abnormality         Status                     ---------                               -----------         ------                     CBC with Differential[[id number]]                                                        Please view results for these tests on the individual orders.   CBC WITH DIFFERENTIAL          Imaging Results    None          Medications - No data to display  Medical Decision Making  Amount and/or Complexity of Data Reviewed  Independent Historian: EMS     Details: EMS reports pt was in the bathroom, when she slipped, and fell on her left leg. EMS denies head trauma or LOC. EMS denies blood thinners. EMS reports GCS 15 en route. EMS reports heart rate 80s and -150s. EMS reports giving 100 mcg fentanyl en route to ED.     Labs: ordered. Decision-making details documented in ED Course.  Radiology: ordered. Decision-making details documented in ED Course.    Risk  Decision regarding hospitalization.            Scribe Attestation:   Scribe #1: I performed the above scribed service and the documentation accurately describes the services I performed. I attest to the accuracy of the note.    Attending Attestation:           Physician Attestation for Scribe:  Physician Attestation Statement for Scribe #1: I, Nick Turcios MD, reviewed documentation, as scribed by Dillan Barrera in my presence, and it is both accurate and complete.                                    Clinical Impression:  Final diagnoses:  [R52] Pain  [Z01.811] Pre-op chest exam          ED Disposition Condition    Admit Stable

## 2024-11-18 NOTE — PLAN OF CARE
11/18/24 1429   Discharge Assessment   Assessment Type Discharge Planning Assessment   Confirmed/corrected address, phone number and insurance Yes   Confirmed Demographics Correct on Facesheet   Source of Information patient;family   Communicated SHERON with patient/caregiver Date not available/Unable to determine   Reason For Admission GLF at home   People in Home spouse   Do you expect to return to your current living situation? Other (see comments)  (tbd)   Do you have help at home or someone to help you manage your care at home? No   Prior to hospitilization cognitive status: Unable to Assess   Current cognitive status: Alert/Oriented   Home Accessibility stairs to enter home   Number of Stairs, Main Entrance three   Stair Railings, Main Entrance none   Equipment Currently Used at Home walker, rolling   Readmission within 30 days? No   Patient currently being followed by outpatient case management? No   Do you currently have service(s) that help you manage your care at home? No   Do you take prescription medications? Yes   Do you have prescription coverage? Yes   Coverage mcr a&b, BCBS mcr   Do you have any problems affording any of your prescribed medications? No   Is the patient taking medications as prescribed? yes   Who is going to help you get home at discharge? family   How do you get to doctors appointments? family or friend will provide   Are you on dialysis? No   Do you take coumadin? No   Discharge Plan A Other  (tbd)   Discharge Plan B Other  (tbd)   DME Needed Upon Discharge  other (see comments)  (tbd)   Discharge Plan discussed with: Patient;Adult children   Transition of Care Barriers None   OTHER   Name(s) of People in Home spouse     Completed assessment with pt and family at bedside. Introduced self and explained role as SW. They verb understanding to all questions asked. PCP is at [facility] and pharmacy is [facility]. D/c dispo pending.    Maty Rankin LCSW

## 2024-11-18 NOTE — ANESTHESIA PREPROCEDURE EVALUATION
11/18/2024  Elvira Rivers is a 79 y.o., female.      Pre-op Assessment    I have reviewed the Patient Summary Reports.    I have reviewed the NPO Status.   I have reviewed the Medications.     Review of Systems  Anesthesia Hx:  No problems with previous Anesthesia             Denies Family Hx of Anesthesia complications.    Denies Personal Hx of Anesthesia complications.                    Social:  Former Smoker       Hematology/Oncology:       -- Anemia:                  Denies Current/Recent Cancer  --  Denies Cancer in past history:                     EENT/Dental:   Uses hearing aids          Cardiovascular:  Exercise tolerance: poor   Hypertension                                          Pulmonary:    Asthma mild and moderate    Uses inhalers twice a day everyday               Renal/:  Chronic Renal Disease, CKD                Hepatic/GI:     GERD                    Physical Exam  General: Cooperative, Alert and Oriented    Airway:  Mallampati: II   Mouth Opening: Normal  TM Distance: Normal  Tongue: Normal  Neck ROM: Normal ROM    Dental:  Edentulous        Anesthesia Plan  Type of Anesthesia, risks & benefits discussed:    Anesthesia Type: Gen ETT  Intra-op Monitoring Plan: Standard ASA Monitors  Post Op Pain Control Plan: multimodal analgesia  Induction:  IV  Airway Plan: Direct and Video, Post-Induction  Informed Consent: Informed consent signed with the Patient and all parties understand the risks and agree with anesthesia plan.  All questions answered. Patient consented to blood products? Yes  ASA Score: 3  Day of Surgery Review of History & Physical: H&P Update referred to the surgeon/provider.I have interviewed and examined the patient. I have reviewed the patient's H&P dated: 11/17/2024. There are no significant changes.     Ready For Surgery From Anesthesia Perspective.      .  Pre-operative evaluation for Procedure(s) (LRB):  INSERTION, INTRAMEDULLARY HANK, FEMUR (Left)    Elvira Rivers is a 79 y.o. female     Patient Active Problem List   Diagnosis    Closed fracture of left femur       Review of patient's allergies indicates:  No Known Allergies    No current facility-administered medications on file prior to encounter.     Current Outpatient Medications on File Prior to Encounter   Medication Sig Dispense Refill    hydroCHLOROthiazide (HYDRODIURIL) 12.5 MG Tab Take 12.5 mg by mouth.      levothyroxine (SYNTHROID) 112 MCG tablet Take 112 mcg by mouth.      rOPINIRole (REQUIP) 3 MG tablet Take 1 tablet by mouth every evening.      simvastatin (ZOCOR) 20 MG tablet Take 20 mg by mouth.         No past surgical history on file.    Family History   Problem Relation Name Age of Onset    Osteoarthritis Mother Kelli Jose Alfredo'     Heart failure Mother [place] Jose Alfredo'     Hypertension Mother Kelli Jose Alfredo'     Arthritis Mother Kelli Jose Alfredo'     Hearing loss Mother Kelli Jose Alfredo'     Heart disease Mother Kelli Cadenare'     Miscarriages / Stillbirths Mother Kelli Cadenare'     Vision loss Mother [place] Jose Alfredo'         Macular Degeneration    Melanoma Mother Kellima Dupre' 70    Prostate cancer Father Donnelly Dupre' 70        metastatic    Heart failure Father Donnelly Dupre'     Arthritis Father Donnelly Dupre'     Asthma Sister Elvira Rivers     Thyroid disease Sister Elvira Rivers     Thyroid disease Sister Matilde Becky     Breast cancer Sister Katy 69    Genetic Disorder Sister Katy         CHEK2 mutation    Throat cancer Brother Faustoki Cadenare' 62        Squamous Cell Ca of tonsil    Hearing loss Brother Fausto Jose Alfredo'     Heart disease Brother Fausto Jose Alfredo'     Skin cancer Brother Faustoki Cadenare'     Breast cancer Maternal Grandmother Veronica Aron     Diabetes Maternal Grandmother Veronica Aron     Heart failure Maternal Grandmother Veronica Aron     Heart disease Maternal Grandmother  "Veronica Aron     Leukemia Maternal Grandfather      Breast cancer Paternal Grandmother Ion Cadenare'     Heart failure Paternal Grandmother Ion Jose Alfredo'     Cancer Paternal Grandmother Ion Cadenare'     Heart disease Paternal Grandmother Ion Cadenare'     Miscarriages / Stillbirths Paternal Grandmother Ion Jose Alfredo'     Heart failure Paternal Grandfather Mendoza Cadenare'     Heart disease Paternal Grandfather Mendoza Cadenare'     Hearing loss Maternal Uncle SUSIE Aron     Stroke Paternal Aunt Daisy Cadenare'     Hearing loss Paternal Aunt Daisy Veliz'         Deaf mute    Hearing loss Paternal Uncle Jay Veliz'         Deaf mute    Hearing loss Paternal Uncle Darrell Veliz'     Brain cancer Maternal Cousin  66       Social History     Socioeconomic History    Marital status:          CBC:   Recent Labs     11/17/24 1959 11/18/24  0517   WBC 11.57* 10.77   RBC 3.94* 3.67*   HGB 11.8* 10.9*   HCT 34.7* 32.6*    271   MCV 88.1 88.8   MCH 29.9 29.7   MCHC 34.0 33.4       CMP:   Recent Labs     11/17/24 1959 11/18/24  0517   * 137   K 4.6 5.4*    106   CO2 21* 25   BUN 23.3* 16.8   CREATININE 1.41* 1.27*   MG  --  2.20   PHOS  --  4.0   CALCIUM 9.1 8.6   ALBUMIN 4.0  --    ALKPHOS 56  --    ALT 13  --    AST 17  --    BILITOT 0.3  --        INR  No results for input(s): "PT", "INR", "PROTIME", "APTT" in the last 72 hours.    No results found for: "LABA1C", "HGBA1C"        Diagnostic Studies:  X-Ray Chest AP Portable    Result Date: 11/17/2024  No acute disease is seen Electronically signed by: Charlie Rubalcava MD Date:    11/17/2024 Time:    20:00    No results found in the last 24 hours.  No results found in the last 24 hours.  No results found for this or any previous visit.    EKG:  No results found for this or any previous visit.    Subjective & objective note cannot be loaded without a specified hospital service.           "

## 2024-11-18 NOTE — ANESTHESIA PROCEDURE NOTES
Intubation    Date/Time: 11/18/2024 9:07 AM    Performed by: Jojo Marvin CRNA  Authorized by: Micky Mckee MD    Intubation:     Induction:  Rapid sequence induction    Intubated:  Postinduction    Mask Ventilation:  Easy mask    Attempts:  1    Attempted By:  Other (see comments) (ENT resident Dr. Davidson)    Method of Intubation:  Video laryngoscopy    Blade:  Jacinto 3    Laryngeal View Grade: Grade I - full view of cords      Difficult Airway Encountered?: No      Complications:  None    Airway Device:  Oral endotracheal tube    Airway Device Size:  7.0    Style/Cuff Inflation:  Cuffed (inflated to minimal occlusive pressure) (inflated to 70jjX05 as verified by manometer)    Inflation Amount (mL):  6    Tube secured:  22    Secured at:  The lips    Placement Verified By:  Capnometry    Complicating Factors:  None    Findings Post-Intubation:  BS equal bilateral and atraumatic/condition of teeth unchanged  Notes:      Pt recently had oral surgery and has sutures on upper and lower gums. Care taken to avoid manipulating or putting pressure on gums for intubation. No bleeding or disruption of gum sutures post-induction

## 2024-11-18 NOTE — ED NOTES
Leg manipulated into place by MD mcneill. X-ray ordered to confirm placement. Knee immobilizer placed

## 2024-11-18 NOTE — PROGRESS NOTES
Consult received and chart reviewed, cc with nsg and pt with decreased BP's presently, holding pain meds, agree with nsg to defer at this time and con't to f/u tomorrow as appropriate.

## 2024-11-18 NOTE — OP NOTE
OPERATIVE REPORT    Patient: Elvira Rivers   : 1945    MRN: [id number]  Date: 2024      Surgeon:Oc Story DO  Assistant: Wai Mccormack was essential, part of the procedure including deep hardware placement and deep closure.  No senior assistant was availible  Preoperative Diagnosis: : Closed left diaphyseal femur fracture, Possible stress fracture  Postoperative Diagnosis: Same  Procedure:  Treatment left diaphyseal femur fracture with intramedullary nail CPT 24993  Anesthesiologist: Micky Mckee MD  OR Staff: Circulator: Shawn Montesinos RN  Physician Assistant: Haily Mccormack PA-C  Scrub Person: Snehal Ponce ST  Implants:   Implant Name Type Inv. Item Serial No.  Lot No. LRB No. Used Action   HANK REAM BALL TP [id number] - [id number]  HANK REAM BALL TP [id number]  [employer] [id number] Left 1 Implanted and Explanted   NAIL IM RHEA 130 DEG 11X360 L - [id number]  NAIL IM RHEA 130 DEG 11X360 L  [employer] [id number] Left 1 Implanted   BLADE HELICAL PERF GOLD 85MM - [id number]  BLADE HELICAL PERF GOLD 85MM  [employer] [id number] Left 1 Implanted   SCREW BONE LOCK IM NAIL 34X5MM - [id number]  SCREW BONE LOCK IM NAIL 34X5MM  [employer] [id number] Left 1 Implanted   SCREW XL25 IM NAIL 38X5MM - [id number]  SCREW XL25 IM NAIL 38X5MM  [employer] [id number] Left 1 Implanted   WIRE GUIDE 3.2MM 400MM - [id number]  WIRE GUIDE 3.2MM 400MM  [employer]  Left 2 Implanted and Explanted     EBL: 200cc  Complications: None  Disposition: To PACU, stable    Indications: Elvira Rivers is a 79 y.o. female presenting with the aforementioned injuries. The procedure is indicated to best obtain and maintain stability of the femur while allowing early ROM.  The patient is awake and alert. After thorough discussion of the risks, benefits, expected outcomes, and alternatives to surgical intervention, the patient agreed to proceed with surgical treatment.  Specific risks discussed  included, but were not limited to: superficial or deep infection, wound healing complications, DVT/PE, significant bleeding requiring transfusion, damage to named anatomic structures in the immediate area including named neurovascular structures, implant failure, and general risks of anesthesia.  The patient voiced understanding and written as well as verbal consent was obtained by myself prior to the procedure.    Procedure in Detail:  The patient's left lower extremity was marked by me in the preoperative area.  She was taken to the operating room, and after satisfactory induction of anesthesia, the patient was placed in the supine position with a small bump under the ipsilateral hip.   Peroneal post placed all bony prominences well padded patient's boots were placed in the [place] table.  Patient received a seatbelt abdominal strap.   The ipsilateral lower extremity was then sterilely prepped and draped in the usual sterile fashion.  Standard time out procedure was performed confirming the correct surgeon, site, side, patient ID, procedure, and administration of antibiotics.       A small longitudinal incision was made proximal to the tip of the greater trochanter. Incision continued through the fascia of the abductors. Bovie was used for hemostasis.  We bluntly felt down to the tip of the greater trochanter, and a guide wire was placed in the appropriate starting position at the greater trochanter.  Once this position was confirmed with  C-arm  in multiple planes, the wire was advanced into the proximal femur. The starting reamer was then used through protected soft tissues to create the entry hole over the guide wire.  Next, a long beaded-tip reaming wire was placed.  C-arm images in multiple planes confirmed successful crossing of the fracture site and intramedullary location of the wire in the distal segment.  Femur length was measured over the wire.  Reduction of the fracture maintained using closed techniques,  and the canal was sequentially reamed over the wire to a final size of 1.5mm over the final nail size.  A Synthes TFN nail was assembled to its jig, and inserted over the guide wire.  The nail was seated fully, and the wire was removed.  C-arm images confirmed good alignment and reduction of the fracture.      This fracture is a short oblique pattern.  I do see beaking at the lateral cortex.  With the nail at the appropriate depth, the jig was used to place 1 proximal healical blade. These screws were placed in standard fashion.  C-arm confirmed good position of these screws. Two distal interlocking screw were then placed through a small incision using free-hand perfect [place] technique.      Final C-arm images confirmed good reduction and alignment of the fracture, and good position of all hardware.  The leg clinically appeared to have normal rotational alignment as compared to C-arm images of the opposite side.     The hip was evaluated under C-arm fluoroscopy and no fractures were noted.  The knee was examined and did not show any signs of ligamentous laxity.       All wounds were copiously irrigated and closed in a layered fashion after it was.  Vancomycin was placed in the surgical wound bed.  Sterile soft dressings were applied.  The patient remained stable throughout the entire procedure, and was awakened from anesthesia and extubated without obvious complication.  She was transported to the recovery room in apparently stable condition.    All sponge and needle counts were correct at the end of the case.  I was present and participated in all aspects of the procedure.    Prognosis:  The patient will be kept WBAT on the ipsilateral extremity.  DVT prophylaxis is indicated for this patient and procedure.  The patient is at risk of pain, infection, and nonunion, and we will watch for all of these, amongst other issues, as the patient continues to heal.    Patient may need return to the OR for excisional  debridement or washout if infection/skin necrosis is observed.      This note/OR report was created with the assistance of  voice recognition software or phone  dictation.  There may be transcription errors as a result of using this technology however minimal. Effort has been made to assure accuracy of transcription but any obvious errors or omissions should be clarified with the author of the document.       Oc Story, DO  Orthopedic Trauma Surgery

## 2024-11-18 NOTE — PROGRESS NOTES
"   Trauma Surgery   Progress Note  Admit Date: 11/17/2024  HD#1  POD#Day of Surgery    Subjective:   Interval history:  AF HDS. Tmax 98.9.  Reports LLE pain now controlled.  K 5.4 from 4.6. Cr 1.2 from 1.4.  OR today with orthopedic surgery.     Home Meds:  Current Outpatient Medications   Medication Instructions    hydroCHLOROthiazide (HYDRODIURIL) 12.5 mg    levothyroxine (SYNTHROID) 112 mcg    rOPINIRole (REQUIP) 3 MG tablet 1 tablet, Nightly    simvastatin (ZOCOR) 20 mg      Scheduled Meds:   ceFAZolin (Ancef) IV (PEDS and ADULTS)  2 g Intravenous Q8H    enoxparin  30 mg Subcutaneous Daily    hydroCHLOROthiazide  12.5 mg Oral Daily    levothyroxine  112 mcg Oral Daily    methocarbamoL  500 mg Oral TID    [START ON 11/19/2024] mupirocin   Nasal BID    polyethylene glycol  17 g Oral Daily    rOPINIRole  3 mg Oral QHS     Continuous Infusions:   lactated ringers   Intravenous Continuous 100 mL/hr at 11/18/24 0105 New Bag at 11/18/24 0105     PRN Meds:  Current Facility-Administered Medications:     acetaminophen, 650 mg, Oral, Q8H PRN    acetaminophen, 650 mg, Oral, Q4H PRN    ceFAZolin 2 g/50mL Dextrose IVPB, 2 g, Intravenous, On Call Procedure    LIDOcaine (PF) 10 mg/ml (1%), 1 mL, Intradermal, Once PRN    melatonin, 6 mg, Oral, Nightly PRN    morphine, 4 mg, Intravenous, Q6H PRN    ondansetron, 8 mg, Oral, Q8H PRN    oxyCODONE, 10 mg, Oral, Q4H PRN    oxyCODONE, 5 mg, Oral, Q4H PRN    promethazine, 25 mg, Oral, Q6H PRN    sodium chloride 0.9%, 10 mL, Intravenous, PRN    vancomycin, , , PRN     Objective:     VITAL SIGNS: 24 HR MIN & MAX LAST   Temp  Min: 97.2 °F (36.2 °C)  Max: 98.9 °F (37.2 °C)  98.4 °F (36.9 °C)   BP  Min: 118/64  Max: 167/58  (!) 125/54    Pulse  Min: 70  Max: 112  70    Resp  Min: 13  Max: 20  18    SpO2  Min: 95 %  Max: 100 %  99 % (RA)      HT: 5' 3" (160 cm)  WT: 58.5 kg (129 lb)  BMI: 22.9     Intake/output:  Intake/Output - Last 3 Shifts         11/16 0700 11/17 0659 11/17 0700 11/18 " "0659 11/18 0700 11/19 0659    IV Piggyback   150    Total Intake(mL/kg)   150 (2.6)    Urine (mL/kg/hr)  1200     Total Output  1200     Net  -1200 +150                   Intake/Output Summary (Last 24 hours) at 11/18/2024 0942  Last data filed at 11/18/2024 0939  Gross per 24 hour   Intake 150 ml   Output 1200 ml   Net -1050 ml           Lines/drains/airway:       Peripheral IV - Single Lumen 11/17/24 2000 20 G Anterior;Proximal;Right Forearm (Active)   Site Assessment Intact 11/18/24 0400   Line Status Saline locked 11/18/24 0400   Dressing Status Intact 11/18/24 0400   Dressing Intervention Integrity maintained 11/18/24 0400   Number of days: 0            Urethral Catheter 11/17/24 2237 (Active)   Site Assessment Clean;Intact 11/18/24 0013   Collection Container Standard drainage bag 11/18/24 0013   Securement Method secured to top of thigh w/ adhesive device 11/18/24 0013   Catheter Care Performed yes 11/18/24 0013   Reason for Continuing Urinary Catheterization Required immobilization 11/18/24 0013   Output (mL) 600 mL 11/18/24 0013   Number of days: 0       Physical examination:  Gen: NAD, AAOx3, answering questions appropriately  HEENT:  CV: RR  Resp: NWOB  Abd: S/NT/ND  Ext: moving all extremities spontaneously and purposefully. Knee immobilizer, traction to LLE.  Neuro: CN II-XII grossly intact  Skin/wounds:    Labs:  Renal:  Recent Labs     11/17/24 1959 11/18/24 0517   BUN 23.3* 16.8   CREATININE 1.41* 1.27*     No results for input(s): "LACTIC" in the last 72 hours.  FENGI:  Recent Labs     11/17/24 1959 11/18/24 0517   * 137   K 4.6 5.4*    106   CO2 21* 25   CALCIUM 9.1 8.6   MG  --  2.20   PHOS  --  4.0   ALBUMIN 4.0  --    BILITOT 0.3  --    AST 17  --    ALKPHOS 56  --    ALT 13  --      Heme:  Recent Labs     11/17/24 1959 11/18/24 0517   HGB 11.8* 10.9*   HCT 34.7* 32.6*    271     ID:  Recent Labs     11/17/24 1959 11/18/24  0517   WBC 11.57* 10.77     CBG:  Recent Labs " "    11/17/24 1959 11/18/24  0517   GLUCOSE 114 97      Cardiovascular:  No results for input(s): "TROPONINI", "CKTOTAL", "CKMB", "BNP" in the last 168 hours.  ABG:  No results for input(s): "PH", "PO2", "PCO2", "HCO3", "BE" in the last 168 hours.   I have reviewed all pertinent lab results within the past 24 hours.    Imaging:  X-Ray Femur 2 View Left   Final Result      The angulation of the femur fracture is mildly improved.  Bowel         Electronically signed by: Charlie Rubalcava MD   Date:    11/17/2024   Time:    22:45      X-Ray Femur Ap/Lat Left   Final Result      Study showing midshaft femur fracture         Electronically signed by: Charlie Rubalcava MD   Date:    11/17/2024   Time:    20:10      X-Ray Chest AP Portable   Final Result      No acute disease is seen         Electronically signed by: Charlie Rubalcava MD   Date:    11/17/2024   Time:    20:00      X-Ray Pelvis Routine AP   Final Result      No acute abnormalities are seen         Electronically signed by: Charlie Rubalcava MD   Date:    11/17/2024   Time:    20:03      X-Ray Femur 2 View Left    (Results Pending)      I have reviewed all pertinent imaging results/findings within the past 24 hours.    Micro/Path/Other:  Microbiology Results (last 7 days)       ** No results found for the last 168 hours. **           Pathology Results  (Last 7 days)      None             Assessment & Plan:   Patient is a 79 year old female who presents for LLE pain following mechanical GLF at home. Imaging concerning for midshaft fracture of left femur. Trauma surgery consulted for admission for future orthopedic intervention.     - To OR today with orthopedic surgery  - MMPC  - NPO for possible orthopedic intervention today  - mIVF while NPO  - Lovenox, SCDs for DVT ppx  - Anti-emetics PRN  - Home medications  - Daily labs      Thor Iglesias MD  General Surgery PGY-1  [facility]      "

## 2024-11-18 NOTE — H&P
[facility]  Emergency Dept  Trauma Services  History & Physical       SUBJECTIVE:     Chief Complaint   Patient presents with    Leg Pain     Pt arrives via [employer] [facility], pt fell, no head strike, - Blood thinners, Left lower ext shortened and externally rotated, deformity, + PMS     History of Present Illness:     Patient is a 79 year old female who presents for LLE pain following mechanical GLF at home. Imaging concerning for midshaft fracture of left femur. Trauma surgery consulted for admission for future orthopedic intervention.  Patient reports she was taking a shower when she fell after her shower handle broke. Patient denies any head trauma and denies any LOC. She additionally denies taking any blood thinners at home. Her only complaint is LLE pain, no other injuries reported. She denies any fevers, chills, abdominal pain, and n/v/d.    No current facility-administered medications on file prior to encounter.     Current Outpatient Medications on File Prior to Encounter   Medication Sig Dispense Refill    hydroCHLOROthiazide (HYDRODIURIL) 12.5 MG Tab Take 12.5 mg by mouth.      levothyroxine (SYNTHROID) 112 MCG tablet Take 112 mcg by mouth.      rOPINIRole (REQUIP) 3 MG tablet Take 1 tablet by mouth every evening.      simvastatin (ZOCOR) 20 MG tablet Take 20 mg by mouth.         Review of patient's allergies indicates:  No Known Allergies     No past medical history on file.  No past surgical history on file.         Review of Systems:  12 point ROS negative except per HPI    OBJECTIVE:     Vital Signs (Most Recent):  Temp: 97.2 °F (36.2 °C) (11/17/24 1909)  Pulse: 98 (11/17/24 2313)  Resp: 14 (11/17/24 2313)  BP: 137/66 (11/17/24 2312)  SpO2: 98 % (11/17/24 2313)       Physical Exam:  Physical Exam    Nursing note and vitals reviewed.  Constitutional: She appears well-developed and well-nourished.   HENT:   Head: Normocephalic and atraumatic.   Eyes: EOM are normal.   Neck:   Normal range of  "motion.  Cardiovascular:  Normal rate.           Pulmonary/Chest: No respiratory distress.   Abdominal: Abdomen is soft. There is no abdominal tenderness.   Musculoskeletal:      Cervical back: Normal range of motion.      Comments: Knee immobilizer to LLE       Neurological: She is alert and oriented to person, place, and time.   Skin: Skin is warm and dry.   Psychiatric: She has a normal mood and affect. Thought content normal.           Laboratory and Radiology Data:  Recent Labs     11/17/24 1959   WBC 11.57*   HGB 11.8*   HCT 34.7*        Recent Labs     11/17/24 1959   *   K 4.6      CO2 21*   BUN 23.3*   CREATININE 1.41*   CALCIUM 9.1   ALBUMIN 4.0   BILITOT 0.3   AST 17   ALKPHOS 56   ALT 13     No results for input(s): "POCTGLUCOSE" in the last 72 hours.     Imaging Results              X-Ray Femur 2 View Left (Final result)  Result time 11/17/24 22:45:59      Final result by Charlie Rubalcava MD (11/17/24 22:45:59)                   Impression:      The angulation of the femur fracture is mildly improved.  Bowel      Electronically signed by: Charlie Rubalcava MD  Date:    11/17/2024  Time:    22:45               Narrative:    EXAMINATION:  XR FEMUR 2 VIEW LEFT    CLINICAL HISTORY:  reduction of femur;    TECHNIQUE:  AP and lateral views of the left femur were performed.    COMPARISON:  None}    FINDINGS:  There is midshaft femur fracture.  There is persistent angulation.                                       X-Ray Femur Ap/Lat Left (Final result)  Result time 11/17/24 20:10:06      Final result by Charlie Rubalcava MD (11/17/24 20:10:06)                   Impression:      Study showing midshaft femur fracture      Electronically signed by: Charlie Rubalcava MD  Date:    11/17/2024  Time:    20:10               Narrative:    EXAMINATION:  XR FEMUR 2 VIEW LEFT    CLINICAL HISTORY:  Pain, unspecified    TECHNIQUE:  AP and lateral views of the left femur were " performed.    COMPARISON:  None}    FINDINGS:  There is a midshaft femur fracture.  Fracture is angulated but not significantly displaced.                                       X-Ray Chest AP Portable (Final result)  Result time 11/17/24 20:00:56      Final result by Charlie Rubalcava MD (11/17/24 20:00:56)                   Impression:      No acute disease is seen      Electronically signed by: Charlie Rubalcava MD  Date:    11/17/2024  Time:    20:00               Narrative:    EXAMINATION:  XR CHEST AP PORTABLE    CLINICAL HISTORY:  Encounter for preprocedural respiratory examination    TECHNIQUE:  Single frontal view of the chest was performed.    COMPARISON:  08/07/2024    FINDINGS:  There are mild chronic interstitial changes.  Heart size is within normal limits.  Costophrenic angles are clear.  There is vascular calcification noted.                                       X-Ray Pelvis Routine AP (Final result)  Result time 11/17/24 20:03:11   Procedure changed from X-Ray Hip 2 or 3 views Left with Pelvis when performed     Final result by Charlie Rubalcava MD (11/17/24 20:03:11)                   Impression:      No acute abnormalities are seen      Electronically signed by: Charlie Rubalcava MD  Date:    11/17/2024  Time:    20:03               Narrative:    EXAMINATION:  XR PELVIS ROUTINE AP    CLINICAL HISTORY:  pain;    TECHNIQUE:  AP view of the pelvis was performed.    COMPARISON:  None.    FINDINGS:  There are no acute fractures seen.  There is no dislocation.  There are no bony lesions noted.                                            ASSESSMENT/PLAN:  Patient is a 79 year old female who presents for LLE pain following mechanical GLF at home. Imaging concerning for midshaft fracture of left femur. Trauma surgery consulted for admission for future orthopedic intervention.    - Admit to trauma surgery  - Follow up orthopedic surgery recommendations  - MMPC  - NPO  - mIVF while NPO  - Lovenox, SCDs for DVT ppx  -  Anti-emetics PRN  - Home medications  - Daily labs     Active Hospital Problems    Diagnosis  POA    *Closed fracture of left femur [S72.92XA]  Yes      Resolved Hospital Problems   No resolved problems to display.          VTE Risk Mitigation (From admission, onward)           Ordered     enoxaparin injection 30 mg  Daily         11/17/24 2316     IP VTE HIGH RISK PATIENT  Once         11/17/24 2316     Place sequential compression device  Until discontinued         11/17/24 2316                       Thor Iglesias MD  General Surgery  [facility] - Emergency Dept

## 2024-11-18 NOTE — CONSULTS
[facility] - Ortho Neuro  Orthopedic Trauma  Consult Note    Patient Name: Elvira Rivers  MRN: [id number]  Admission Date: 11/17/2024  Hospital Length of Stay: 1 days  Attending Provider: Guilherme Umanzor MD  Primary Care Provider: Aruna Goncalves FNP-DERRICK        Inpatient consult to Orthopedic Surgery  Consult performed by: Oc Story DO  Consult ordered by: Thor Iglesias MD        Subjective:         Chief Complaint:   Chief Complaint   Patient presents with    Leg Pain     Pt arrives via [employer] [facility], pt fell, no head strike, - Blood thinners, Left lower ext shortened and externally rotated, deformity, + PMS        HPI:  Patient has a left femoral shaft fracture.  Patient has dull achy pain in the left thigh worse with range of motion better rest has been NPO over midnight.  Previous oral procedure with tooth extraction and sutures.  No numbness no tingling.  Family bedside.  Community ambulator with assist at baseline.    No past medical history on file.    No past surgical history on file.    Review of patient's allergies indicates:  No Known Allergies    Current Facility-Administered Medications   Medication    acetaminophen tablet 650 mg    acetaminophen tablet 650 mg    enoxaparin injection 30 mg    hydroCHLOROthiazide tablet 12.5 mg    lactated ringers infusion    levothyroxine tablet 112 mcg    LIDOcaine (PF) 10 mg/ml (1%) injection 10 mg    melatonin tablet 6 mg    methocarbamoL tablet 500 mg    morphine injection 2 mg    [START ON 11/19/2024] mupirocin 2 % ointment    ondansetron disintegrating tablet 8 mg    oxyCODONE immediate release tablet 5 mg    oxyCODONE immediate release tablet Tab 10 mg    promethazine tablet 25 mg    rOPINIRole tablet 3 mg    sodium chloride 0.9% flush 10 mL     Family History       Problem Relation (Age of Onset)    Arthritis Mother, Father    Asthma Sister    Brain cancer Maternal Cousin (66)    Breast cancer Sister (69), Maternal Grandmother, Paternal  "Grandmother    Cancer Paternal Grandmother    Diabetes Maternal Grandmother    Genetic Disorder Sister    Hearing loss Mother, Brother, Maternal Uncle, Paternal Aunt, Paternal Uncle, Paternal Uncle    Heart disease Mother, Brother, Maternal Grandmother, Paternal Grandmother, Paternal Grandfather    Heart failure Mother, Father, Maternal Grandmother, Paternal Grandmother, Paternal Grandfather    Hypertension Mother    Leukemia Maternal Grandfather    Melanoma Mother (70)    Miscarriages / Stillbirths Mother, Paternal Grandmother    Osteoarthritis Mother    Prostate cancer Father (70)    Skin cancer Brother    Stroke Paternal Aunt    Throat cancer Brother (62)    Thyroid disease Sister, Sister    Vision loss Mother          Tobacco Use    Smoking status: Not on file    Smokeless tobacco: Not on file   Substance and Sexual Activity    Alcohol use: Not on file    Drug use: Not on file    Sexual activity: Not on file       ROS:  Constitutional: Denies fever chills  Eyes: No change in vision  ENT: No ringing or current infections  CV: No chest pain  Resp: No labored breathing  MSK: Pain evident at site of injury located in HPI,   Integ: No signs of abrasions or lacerations  Neuro: No numbness or tingling  Lymphatic: No swelling outside the area of injury   Objective:     Vital Signs (Most Recent):  Temp: 98.4 °F (36.9 °C) (11/18/24 0448)  Pulse: 70 (11/18/24 0448)  Resp: 18 (11/18/24 0448)  BP: 118/64 (11/18/24 0448)  SpO2: 99 % (11/18/24 0448) Vital Signs (24h Range):  Temp:  [97.2 °F (36.2 °C)-98.9 °F (37.2 °C)] 98.4 °F (36.9 °C)  Pulse:  [] 70  Resp:  [13-20] 18  SpO2:  [95 %-100 %] 99 %  BP: (118-167)/(56-98) 118/64     Weight: 58.5 kg (129 lb)  Height: 5' 3" (160 cm)  Body mass index is 22.85 kg/m².      Intake/Output Summary (Last 24 hours) at 11/18/2024 0711  Last data filed at 11/18/2024 0640  Gross per 24 hour   Intake --   Output 1200 ml   Net -1200 ml       Ortho/SPM Exam  General the patient is alert and " oriented x3 no acute distress nontoxic-appearing appropriate affect.    Constitutional: Vital signs are examined and stable.  Resp: No signs of labored breathing      LLE: -Skin:In traction, No signs of new abrasions or lacerations, no scars           -MSK: EHL/FHL, Gastroc/Tib anterior Strength 5/5           -Neuro:  Sensation intact to light touch L3-S1 dermatomes           -Lymphatic: No signs of lymphadenopathy           -CV: Capillary refill is less than 2 seconds. DP/PT pulses 2/4. Compartments soft and compressible                      .     Significant Labs:  I have reviewed all labs in relation to Orthopedics  Recent Lab Results         11/18/24 0517 11/17/24 1959        Albumin/Globulin Ratio   1.4       Albumin   4.0       ALP   56       ALT   13       Anion Gap 6.0   12.0       AST   17       Baso # 0.10   0.09       Basophil % 0.9   0.8       BILIRUBIN TOTAL   0.3       BUN 16.8   23.3       BUN/CREAT RATIO 13   17       Calcium 8.6   9.1       Chloride 106   101       CO2 25   21       Creatinine 1.27   1.41       eGFR 43   38       Eos # 0.03   0.03       Eos % 0.3   0.3       Globulin, Total   2.9       Glucose 97   114       Hematocrit 32.6   34.7       Hemoglobin 10.9   11.8       Immature Grans (Abs) 0.04   0.04       Immature Granulocytes 0.4   0.3       Lymph # 1.89   0.94       LYMPH % 17.5   8.1       Magnesium  2.20         MCH 29.7   29.9       MCHC 33.4   34.0       MCV 88.8   88.1       Mono # 1.10   0.75       Mono % 10.2   6.5       MPV 10.1   9.8       Neut # 7.61   9.72       Neut % 70.7   84.0       nRBC 0.0   0.0       Phosphorus Level 4.0         Platelet Count 271   299       Potassium 5.4   4.6       PROTEIN TOTAL   6.9       RBC 3.67   3.94       RDW 13.3   13.1       Sodium 137   134       WBC 10.77   11.57               Significant Imaging: I have reviewed all pertinent imaging results/findings.  X-Ray Femur 2 View Left    Result Date: 11/17/2024  EXAMINATION: XR FEMUR 2  VIEW LEFT CLINICAL HISTORY: reduction of femur; TECHNIQUE: AP and lateral views of the left femur were performed. COMPARISON: None} FINDINGS: There is midshaft femur fracture.  There is persistent angulation.     The angulation of the femur fracture is mildly improved.  Bowel Electronically signed by: Charlie Rubalcava MD Date:    11/17/2024 Time:    22:45    X-Ray Femur Ap/Lat Left    Result Date: 11/17/2024  EXAMINATION: XR FEMUR 2 VIEW LEFT CLINICAL HISTORY: Pain, unspecified TECHNIQUE: AP and lateral views of the left femur were performed. COMPARISON: None} FINDINGS: There is a midshaft femur fracture.  Fracture is angulated but not significantly displaced.     Study showing midshaft femur fracture Electronically signed by: Charlie Rubalcava MD Date:    11/17/2024 Time:    20:10    X-Ray Pelvis Routine AP    Result Date: 11/17/2024  EXAMINATION: XR PELVIS ROUTINE AP CLINICAL HISTORY: pain; TECHNIQUE: AP view of the pelvis was performed. COMPARISON: None. FINDINGS: There are no acute fractures seen.  There is no dislocation.  There are no bony lesions noted.     No acute abnormalities are seen Electronically signed by: Charlie Rubalcava MD Date:    11/17/2024 Time:    20:03    X-Ray Chest AP Portable    Result Date: 11/17/2024  EXAMINATION: XR CHEST AP PORTABLE CLINICAL HISTORY: Encounter for preprocedural respiratory examination TECHNIQUE: Single frontal view of the chest was performed. COMPARISON: 08/07/2024 FINDINGS: There are mild chronic interstitial changes.  Heart size is within normal limits.  Costophrenic angles are clear.  There is vascular calcification noted.     No acute disease is seen Electronically signed by: Charlie Rubalcava MD Date:    11/17/2024 Time:    20:00       Assessment/Plan:     Active Diagnoses:    Diagnosis Date Noted POA    PRINCIPAL PROBLEM:  Closed fracture of left femur [S72.92XA] 11/17/2024 Yes      Problems Resolved During this Admission:       Independent Radiology ordered by other provider:   Two views left femur skeletally mature individual showing a left femoral shaft fracture with comminution displacement    Pt has acute injury with risk of severe bodily function with their injury has a left femur.     -Risks included with this type of injury loss of range of motion infection possible need for revision surgery    Patient has a left femoral shaft fracture.  Patient meets surgical indications for stabilization.  Allow earlier time to weight-bearing and pain control.  Patient understands risks and benefits with the procedure.  Family bedside.    OR today    I explained that surgery and the nature of their condition are not without risks. These include, but are not limited to, bleeding, infection, neurovascular compromise, malunion, nonunion, hardware complications, wound complications, scarring, cosmetic defects, need for later and/or repeated surgeries, avascular necrosis, bone death due to initial trauma, pain, loss of ROM, loss of function, PTOA, deformity, stance/gait and/or functional abnormalities, thromboembolic complications, compartment syndrome, loss of limb, loss of life, anesthetic complications, and other imponderables. I explained that these can occur despite the adequacy of treatments rendered, and that their risks are heightened given the nature of their condition.  I have also discussed the importance not using nicotine products due to the increased risk of infection surgical wound healing complications and nonunion of the fracture.  They verbalized understanding.  No guarantees were made.  They would like to continue with surgery at this time. If appropriate family was involved with surgical discussion.             This note/OR report was created with the assistance of  voice recognition software or phone  dictation.  There may be transcription errors as a result of using this technology however minimal. Effort has been made to assure accuracy of transcription but any obvious errors or  omissions should be clarified with the author of the document.       Oc Story, DO   Orthopedic Trauma Surgery  [facility] - Ortho Neuro
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
## 2024-11-19 PROBLEM — N18.32 STAGE 3B CHRONIC KIDNEY DISEASE: Status: ACTIVE | Noted: 2024-11-19

## 2024-11-19 LAB
ANION GAP SERPL CALC-SCNC: 8 MEQ/L
BASOPHILS # BLD AUTO: 0.04 X10(3)/MCL
BASOPHILS NFR BLD AUTO: 0.3 %
BUN SERPL-MCNC: 17.1 MG/DL (ref 9.8–20.1)
CALCIUM SERPL-MCNC: 8 MG/DL (ref 8.4–10.2)
CHLORIDE SERPL-SCNC: 101 MMOL/L (ref 98–107)
CO2 SERPL-SCNC: 22 MMOL/L (ref 23–31)
CREAT SERPL-MCNC: 1.02 MG/DL (ref 0.55–1.02)
CREAT/UREA NIT SERPL: 17
EOSINOPHIL # BLD AUTO: 0 X10(3)/MCL (ref 0–0.9)
EOSINOPHIL NFR BLD AUTO: 0 %
ERYTHROCYTE [DISTWIDTH] IN BLOOD BY AUTOMATED COUNT: 13.2 % (ref 11.5–17)
GFR SERPLBLD CREATININE-BSD FMLA CKD-EPI: 56 ML/MIN/1.73/M2
GLUCOSE SERPL-MCNC: 115 MG/DL (ref 82–115)
HCT VFR BLD AUTO: 27.6 % (ref 37–47)
HGB BLD-MCNC: 9.2 G/DL (ref 12–16)
IMM GRANULOCYTES # BLD AUTO: 0.06 X10(3)/MCL (ref 0–0.04)
IMM GRANULOCYTES NFR BLD AUTO: 0.4 %
LYMPHOCYTES # BLD AUTO: 1.23 X10(3)/MCL (ref 0.6–4.6)
LYMPHOCYTES NFR BLD AUTO: 8.7 %
MAGNESIUM SERPL-MCNC: 2 MG/DL (ref 1.6–2.6)
MCH RBC QN AUTO: 29.8 PG (ref 27–31)
MCHC RBC AUTO-ENTMCNC: 33.3 G/DL (ref 33–36)
MCV RBC AUTO: 89.3 FL (ref 80–94)
MONOCYTES # BLD AUTO: 1.15 X10(3)/MCL (ref 0.1–1.3)
MONOCYTES NFR BLD AUTO: 8.2 %
NEUTROPHILS # BLD AUTO: 11.59 X10(3)/MCL (ref 2.1–9.2)
NEUTROPHILS NFR BLD AUTO: 82.4 %
NRBC BLD AUTO-RTO: 0 %
PHOSPHATE SERPL-MCNC: 3.3 MG/DL (ref 2.3–4.7)
PLATELET # BLD AUTO: 218 X10(3)/MCL (ref 130–400)
PMV BLD AUTO: 10.1 FL (ref 7.4–10.4)
POTASSIUM SERPL-SCNC: 4.2 MMOL/L (ref 3.5–5.1)
RBC # BLD AUTO: 3.09 X10(6)/MCL (ref 4.2–5.4)
SODIUM SERPL-SCNC: 131 MMOL/L (ref 136–145)
WBC # BLD AUTO: 14.07 X10(3)/MCL (ref 4.5–11.5)

## 2024-11-19 PROCEDURE — 36415 COLL VENOUS BLD VENIPUNCTURE: CPT

## 2024-11-19 PROCEDURE — 97116 GAIT TRAINING THERAPY: CPT

## 2024-11-19 PROCEDURE — 63600175 PHARM REV CODE 636 W HCPCS

## 2024-11-19 PROCEDURE — 85025 COMPLETE CBC W/AUTO DIFF WBC: CPT

## 2024-11-19 PROCEDURE — 21400001 HC TELEMETRY ROOM

## 2024-11-19 PROCEDURE — 25000003 PHARM REV CODE 250

## 2024-11-19 PROCEDURE — 84100 ASSAY OF PHOSPHORUS: CPT

## 2024-11-19 PROCEDURE — 80048 BASIC METABOLIC PNL TOTAL CA: CPT

## 2024-11-19 PROCEDURE — 25000003 PHARM REV CODE 250: Performed by: STUDENT IN AN ORGANIZED HEALTH CARE EDUCATION/TRAINING PROGRAM

## 2024-11-19 PROCEDURE — 83735 ASSAY OF MAGNESIUM: CPT

## 2024-11-19 PROCEDURE — 25000003 PHARM REV CODE 250: Performed by: PHYSICIAN ASSISTANT

## 2024-11-19 PROCEDURE — 97162 PT EVAL MOD COMPLEX 30 MIN: CPT

## 2024-11-19 PROCEDURE — 97166 OT EVAL MOD COMPLEX 45 MIN: CPT

## 2024-11-19 PROCEDURE — 63600175 PHARM REV CODE 636 W HCPCS: Performed by: PHYSICIAN ASSISTANT

## 2024-11-19 RX ORDER — ACETAMINOPHEN 325 MG/1
650 TABLET ORAL EVERY 6 HOURS
Status: DISCONTINUED | OUTPATIENT
Start: 2024-11-19 | End: 2024-11-21 | Stop reason: HOSPADM

## 2024-11-19 RX ORDER — SENNOSIDES 8.6 MG/1
8.6 TABLET ORAL 2 TIMES DAILY
Status: DISCONTINUED | OUTPATIENT
Start: 2024-11-19 | End: 2024-11-21 | Stop reason: HOSPADM

## 2024-11-19 RX ORDER — HEPARIN SODIUM 5000 [USP'U]/ML
5000 INJECTION, SOLUTION INTRAVENOUS; SUBCUTANEOUS EVERY 8 HOURS
Status: DISCONTINUED | OUTPATIENT
Start: 2024-11-19 | End: 2024-11-21 | Stop reason: HOSPADM

## 2024-11-19 RX ADMIN — SENNOSIDES 8.6 MG: 8.6 TABLET, FILM COATED ORAL at 01:11

## 2024-11-19 RX ADMIN — OXYCODONE HYDROCHLORIDE 5 MG: 5 TABLET ORAL at 05:11

## 2024-11-19 RX ADMIN — Medication 6 MG: at 07:11

## 2024-11-19 RX ADMIN — LEVOTHYROXINE SODIUM 112 MCG: 112 TABLET ORAL at 09:11

## 2024-11-19 RX ADMIN — METHOCARBAMOL 500 MG: 500 TABLET ORAL at 09:11

## 2024-11-19 RX ADMIN — HEPARIN SODIUM 5000 UNITS: 5000 INJECTION, SOLUTION INTRAVENOUS; SUBCUTANEOUS at 01:11

## 2024-11-19 RX ADMIN — CEFAZOLIN 2 G: 2 INJECTION, POWDER, FOR SOLUTION INTRAMUSCULAR; INTRAVENOUS at 12:11

## 2024-11-19 RX ADMIN — MUPIROCIN: 20 OINTMENT TOPICAL at 09:11

## 2024-11-19 RX ADMIN — SENNOSIDES 8.6 MG: 8.6 TABLET, FILM COATED ORAL at 07:11

## 2024-11-19 RX ADMIN — HEPARIN SODIUM 5000 UNITS: 5000 INJECTION, SOLUTION INTRAVENOUS; SUBCUTANEOUS at 05:11

## 2024-11-19 RX ADMIN — ROPINIROLE HYDROCHLORIDE 3 MG: 1 TABLET, FILM COATED ORAL at 07:11

## 2024-11-19 RX ADMIN — ACETAMINOPHEN 650 MG: 325 TABLET, FILM COATED ORAL at 01:11

## 2024-11-19 RX ADMIN — ACETAMINOPHEN 650 MG: 325 TABLET, FILM COATED ORAL at 07:11

## 2024-11-19 RX ADMIN — METHOCARBAMOL 500 MG: 500 TABLET ORAL at 07:11

## 2024-11-19 RX ADMIN — OXYCODONE HYDROCHLORIDE 10 MG: 10 TABLET ORAL at 07:11

## 2024-11-19 RX ADMIN — POLYETHYLENE GLYCOL 3350 17 G: 17 POWDER, FOR SOLUTION ORAL at 09:11

## 2024-11-19 RX ADMIN — ACETAMINOPHEN 650 MG: 325 TABLET, FILM COATED ORAL at 05:11

## 2024-11-19 RX ADMIN — HEPARIN SODIUM 5000 UNITS: 5000 INJECTION, SOLUTION INTRAVENOUS; SUBCUTANEOUS at 09:11

## 2024-11-19 RX ADMIN — OXYCODONE HYDROCHLORIDE 5 MG: 5 TABLET ORAL at 01:11

## 2024-11-19 NOTE — PROGRESS NOTES
"[facility]  Or[facility]ics  Progress Note    Patient Name: Elvira Rivers  MRN: [id number]  Admission Date: 11/17/2024  Hospital Length of Stay: 2 days  Attending Provider: Guilherme Umanzor MD  Primary Care Provider: Aruna Goncalves FNP-C    Subjective:     Principal Problem:Closed fracture of left femur    Principal Orthopedic Problem: 1 Day Post-Op   IMN L femur fracture    Interval History: Seen this am. Daughter at bedside. Doing well and pain controlled. Discussed therapy today for out of bed. Daughter states that patient does not live in an accessible house and lives alone with .     Review of patient's allergies indicates:  No Known Allergies    Current Facility-Administered Medications   Medication    acetaminophen tablet 650 mg    heparin (porcine) injection 5,000 Units    hydroCHLOROthiazide tablet 12.5 mg    levothyroxine tablet 112 mcg    LIDOcaine (PF) 10 mg/ml (1%) injection 10 mg    melatonin tablet 6 mg    methocarbamoL tablet 500 mg    morphine injection 4 mg    mupirocin 2 % ointment    ondansetron disintegrating tablet 8 mg    oxyCODONE immediate release tablet 5 mg    oxyCODONE immediate release tablet Tab 10 mg    polyethylene glycol packet 17 g    promethazine tablet 25 mg    rOPINIRole tablet 3 mg    sodium chloride 0.9% flush 10 mL     Objective:     Vital Signs (Most Recent):  Temp: 97.6 °F (36.4 °C) (11/19/24 0807)  Pulse: 74 (11/19/24 0920)  Resp: 18 (11/19/24 0807)  BP: (!) 105/55 (11/19/24 0807)  SpO2: 99 % (11/19/24 0807) Vital Signs (24h Range):  Temp:  [97.6 °F (36.4 °C)-98.9 °F (37.2 °C)] 97.6 °F (36.4 °C)  Pulse:  [59-75] 74  Resp:  [12-18] 18  SpO2:  [96 %-100 %] 99 %  BP: ()/(42-63) 105/55     Weight: 58.5 kg (129 lb)  Height: 5' 3" (160 cm)  Body mass index is 22.85 kg/m².      Intake/Output Summary (Last 24 hours) at 11/19/2024 1027  Last data filed at 11/18/2024 2323  Gross per 24 hour   Intake 600 ml   Output 1750 ml   Net -1150 ml "       Physical Exam:   General the patient is alert and in no acute distress;  nontoxic-appearing appropriate affect.    Constitutional: Vital signs are examined and stable.  Resp: No signs of labored breathing               LLE: -Skin: Dressing CDI           -MSK: +Hip and Knee F/E, +EHL/FHL, + DF/PF           -Neuro:  Sensation intact to light touch throughout           -CV: Capillary refill is less than 2 seconds. +DP. Compartments soft and compressible      Diagnostic Findings:     Significant Labs: CBC:   Recent Labs   Lab 11/17/24 1959 11/18/24  0517 11/19/24  0420   WBC 11.57* 10.77 14.07*   HGB 11.8* 10.9* 9.2*   HCT 34.7* 32.6* 27.6*    271 218     All pertinent labs within the past 24 hours have been reviewed.    Significant Imaging: I have reviewed all pertinent imaging results/findings.     Assessment/Plan:     Active Diagnoses:    Diagnosis Date Noted POA    PRINCIPAL PROBLEM:  Closed fracture of left femur [S72.92XA] 11/17/2024 Yes    Fall in shower [W18.2XXA] 11/19/2024 Yes    Stage 3b chronic kidney disease [N18.32] 11/19/2024 Yes      Problems Resolved During this Admission:   78 Yo F POD #1 IMN L femur    Diet: As tolerated  Pain: multimodal PRN  DVT: Heparin;  OK for ASA 81mg BID on DC x30D  ABX: periop ancef completed  ABLA; expected for fracture ans fixation; hgb 9.2  PT/OT: Eval and Treat  Activity: WBAT LLE  Dry dressing changes begin tomorrow; No creams/ointments   CM for DC needs; anticipate placement    The above findings, diagnostics, and treatment plan were discussed with Dr Shrestha who is in agreement with the plan of care except as stated in additional documentation.      DON Calhoun   Orthopedic Trauma Surgery  [facility]

## 2024-11-19 NOTE — TERTIARY TRAUMA SURVEY NOTE
TERTIARY TRAUMA SURVEY (TTS)    List Injuries Identified to Date:   1. Closed angulated but not displaced diaphyseal femur fracture     [x]Problems list reviewed  List Operations and Procedures:   1. Insertion of intramedullary nail L femur     Past Surgical History:   Procedure Laterality Date    INTRAMEDULLARY RODDING OF FEMUR Left 11/18/2024    Procedure: INSERTION, INTRAMEDULLARY HANK, FEMUR;  Surgeon: Oc Story DO;  Location: [facility] OR;  Service: Orthopedics;  Laterality: Left;  supine hana table c arm synthes long nail TFN       Incidental findings:   1. NA    Past Medical History:   1. Stage 3b CKD  2. Asthma     3. Known Lung nodule (w existing follow up in place)      4. Hypertension    5. High cholesterol      6. Anxiety    7. Osteoporosis    8. Hypothyroidism    9. Restless leg    10. Arthritis      Active Ambulatory Problems     Diagnosis Date Noted    No Active Ambulatory Problems     Resolved Ambulatory Problems     Diagnosis Date Noted    No Resolved Ambulatory Problems     No Additional Past Medical History     No past medical history on file.    Tertiary Physical Exam:     Physical Exam  Constitutional:       General: She is not in acute distress.     Appearance: Normal appearance. She is normal weight. She is not ill-appearing.   HENT:      Head: Normocephalic and atraumatic.      Right Ear: External ear normal.      Left Ear: External ear normal.      Nose: Nose normal.      Mouth/Throat:      Mouth: Mucous membranes are moist.      Pharynx: Oropharynx is clear.   Eyes:      Extraocular Movements: Extraocular movements intact.      Conjunctiva/sclera: Conjunctivae normal.      Pupils: Pupils are equal, round, and reactive to light.   Cardiovascular:      Rate and Rhythm: Normal rate and regular rhythm.      Pulses: Normal pulses.      Heart sounds: Normal heart sounds.   Pulmonary:      Effort: Pulmonary effort is normal.      Breath sounds: Normal breath sounds.   Abdominal:      General:  Abdomen is flat. There is no distension.      Palpations: Abdomen is soft.      Tenderness: There is no abdominal tenderness. There is no right CVA tenderness, left CVA tenderness or guarding.   Genitourinary:     Comments: Deferred   Musculoskeletal:         General: Signs of injury present. No swelling.      Cervical back: Normal range of motion.      Right lower leg: No edema.      Left lower leg: No edema.      Comments: BLUE and RLE without obvious deformities, bruising or abrasions. With baseline ROM per pt. LLE with small dressing over incision to superior-lateral and posterior thigh without strikethrough. LLE ankle w full ROM, active ROM at knee/hip limited 2/2 pain, but passive gentle ROM of LLE hip, knee, and ankle full.    Skin:     General: Skin is warm and dry.      Capillary Refill: Capillary refill takes less than 2 seconds.      Findings: No erythema or rash.   Neurological:      General: No focal deficit present.      Mental Status: She is alert and oriented to person, place, and time.   Psychiatric:         Mood and Affect: Mood normal.         Behavior: Behavior normal.         Imaging Review:     Imaging Results              X-Ray Femur 2 View Left (Final result)  Result time 11/17/24 22:45:59      Final result by Charlie Rubalcava MD (11/17/24 22:45:59)                   Impression:      The angulation of the femur fracture is mildly improved.  Bowel      Electronically signed by: Charlie Rubalcava MD  Date:    11/17/2024  Time:    22:45               Narrative:    EXAMINATION:  XR FEMUR 2 VIEW LEFT    CLINICAL HISTORY:  reduction of femur;    TECHNIQUE:  AP and lateral views of the left femur were performed.    COMPARISON:  None}    FINDINGS:  There is midshaft femur fracture.  There is persistent angulation.                                       X-Ray Femur Ap/Lat Left (Final result)  Result time 11/17/24 20:10:06      Final result by Charlie Rubalcava MD (11/17/24 20:10:06)                    Impression:      Study showing midshaft femur fracture      Electronically signed by: Charlie Rubalcava MD  Date:    11/17/2024  Time:    20:10               Narrative:    EXAMINATION:  XR FEMUR 2 VIEW LEFT    CLINICAL HISTORY:  Pain, unspecified    TECHNIQUE:  AP and lateral views of the left femur were performed.    COMPARISON:  None}    FINDINGS:  There is a midshaft femur fracture.  Fracture is angulated but not significantly displaced.                                       X-Ray Chest AP Portable (Final result)  Result time 11/17/24 20:00:56      Final result by Charlie Rubalcava MD (11/17/24 20:00:56)                   Impression:      No acute disease is seen      Electronically signed by: Charlie Rubalcava MD  Date:    11/17/2024  Time:    20:00               Narrative:    EXAMINATION:  XR CHEST AP PORTABLE    CLINICAL HISTORY:  Encounter for preprocedural respiratory examination    TECHNIQUE:  Single frontal view of the chest was performed.    COMPARISON:  08/07/2024    FINDINGS:  There are mild chronic interstitial changes.  Heart size is within normal limits.  Costophrenic angles are clear.  There is vascular calcification noted.                                       X-Ray Pelvis Routine AP (Final result)  Result time 11/17/24 20:03:11   Procedure changed from X-Ray Hip 2 or 3 views Left with Pelvis when performed     Final result by Charlie uRbalcava MD (11/17/24 20:03:11)                   Impression:      No acute abnormalities are seen      Electronically signed by: Charlie Rubalcava MD  Date:    11/17/2024  Time:    20:03               Narrative:    EXAMINATION:  XR PELVIS ROUTINE AP    CLINICAL HISTORY:  pain;    TECHNIQUE:  AP view of the pelvis was performed.    COMPARISON:  None.    FINDINGS:  There are no acute fractures seen.  There is no dislocation.  There are no bony lesions noted.                                       Lab Review:   CBC:  Recent Labs   Lab Result Units 11/17/24 1959 11/18/24  0517  "11/19/24  0420   WBC x10(3)/mcL 11.57* 10.77 14.07*   RBC x10(6)/mcL 3.94* 3.67* 3.09*   Hgb g/dL 11.8* 10.9* 9.2*   Hct % 34.7* 32.6* 27.6*   Platelet x10(3)/mcL 299 271 218   MCV fL 88.1 88.8 89.3   MCH pg 29.9 29.7 29.8   MCHC g/dL 34.0 33.4 33.3       CMP:  Recent Labs   Lab Result Units 11/17/24 1959 11/18/24 0517 11/19/24  0420   Calcium mg/dL 9.1   < > 8.0*   Albumin g/dL 4.0  --   --    Sodium mmol/L 134*   < > 131*   Potassium mmol/L 4.6   < > 4.2   CO2 mmol/L 21*   < > 22*   Chloride mmol/L 101   < > 101   Blood Urea Nitrogen mg/dL 23.3*   < > 17.1   Creatinine mg/dL 1.41*   < > 1.02   ALP unit/L 56  --   --    ALT unit/L 13  --   --    AST unit/L 17  --   --    Bilirubin Total mg/dL 0.3  --   --     < > = values in this interval not displayed.       Troponin:  No results for input(s): "TROPONINI" in the last 2160 hours.    ETOH:  No results for input(s): "ETHANOL" in the last 72 hours.     Urine Drug Screen:  No results for input(s): "COCAINE", "OPIATE", "BARBITURATE", "AMPHETAMINE", "FENTANYL", "CANNABINOIDS", "MDMA" in the last 72 hours.    Invalid input(s): "BENZODIAZEPINE", "PHENCYCLIDINE"     Plan:   Patient is a 79 year old female who presents for LLE pain following mechanical GLF at home. Imaging concerning for midshaft fracture of left femur. Trauma surgery consulted for admission for orthopedic intervention now s/p IMN of L femur frx.     - Continue working well with PT/OT   - Dispo pending ortho rehab placement   - MMPC  - Regular diet   - Bowel Regimen   - Lovenox, SCDs for DVT ppx  - Anti-emetics PRN  - Home medications  - Daily labs     Terrie Kebede MD   [facility] General Surgery     "

## 2024-11-19 NOTE — PLAN OF CARE
Problem: Adult Inpatient Plan of Care  Goal: Plan of Care Review  Outcome: Progressing  Goal: Patient-Specific Goal (Individualized)  Outcome: Progressing  Goal: Absence of Hospital-Acquired Illness or Injury  Outcome: Progressing  Goal: Optimal Comfort and Wellbeing  Outcome: Progressing  Goal: Readiness for Transition of Care  Outcome: Progressing     Problem: Infection  Goal: Absence of Infection Signs and Symptoms  Outcome: Progressing     Problem: Skin Injury Risk Increased  Goal: Skin Health and Integrity  Outcome: Progressing     Problem: Fall Injury Risk  Goal: Absence of Fall and Fall-Related Injury  Outcome: Progressing     Problem: Wound  Goal: Optimal Coping  Outcome: Progressing  Goal: Optimal Functional Ability  Outcome: Progressing  Goal: Absence of Infection Signs and Symptoms  Outcome: Progressing  Goal: Improved Oral Intake  Outcome: Progressing  Goal: Optimal Pain Control and Function  Outcome: Progressing  Goal: Skin Health and Integrity  Outcome: Progressing  Goal: Optimal Wound Healing  Outcome: Progressing

## 2024-11-19 NOTE — PROGRESS NOTES
Inpatient Nutrition Evaluation    Admit Date: 11/17/2024   Total duration of encounter: 2 days    Nutrition Recommendation/Prescription     Continue oral diet as tolerated; Diet Adult Regular Pureed (IDDSI Level 4)   Will add Ensure Plus High Protein (350 kcal, 20 gm protein per container)    Nutrition Assessment     Chart Review    Reason Seen: continuous nutrition monitoring    Malnutrition Screening Tool Results   Have you recently lost weight without trying?: Yes: 2-13 lbs  Have you been eating poorly because of a decreased appetite?: No   MST Score: 1     Diagnosis:   s/p fall with L femur fx     Relevant Medical History: Stage 3b chronic kidney disease     Nutrition-Related Medications: Scheduled Medications:  acetaminophen, 650 mg, Q6H  heparin (porcine), 5,000 Units, Q8H  hydroCHLOROthiazide, 12.5 mg, Daily  levothyroxine, 112 mcg, Daily  methocarbamoL, 500 mg, TID  mupirocin, , BID  polyethylene glycol, 17 g, Daily  rOPINIRole, 3 mg, QHS  senna, 8.6 mg, BID    Continuous Infusions:   PRN Medications:    acetaminophen tablet 650 mg    heparin (porcine) injection 5,000 Units    hydroCHLOROthiazide tablet 12.5 mg    levothyroxine tablet 112 mcg    methocarbamoL tablet 500 mg    mupirocin 2 % ointment    polyethylene glycol packet 17 g    rOPINIRole tablet 3 mg    senna tablet 8.6 mg        Nutrition-Related Labs:  Recent Labs   Lab 11/17/24 1959 11/18/24  0517 11/18/24  1749 11/19/24  0420   * 137 131* 131*   K 4.6 5.4* 4.3 4.2   CALCIUM 9.1 8.6 8.5 8.0*   PHOS  --  4.0  --  3.3   MG  --  2.20  --  2.00    106 102 101   CO2 21* 25 20* 22*   BUN 23.3* 16.8 15.4 17.1   CREATININE 1.41* 1.27* 1.24* 1.02   EGFRNORACEVR 38 43 44 56   GLUCOSE 114 97 162* 115   BILITOT 0.3  --   --   --    ALKPHOS 56  --   --   --    ALT 13  --   --   --    AST 17  --   --   --    ALBUMIN 4.0  --   --   --    WBC 11.57* 10.77  --  14.07*   HGB 11.8* 10.9*  --  9.2*   HCT 34.7* 32.6*  --  27.6*        Diet Order: Diet  "Adult Regular Pureed (IDDSI Level 4)  Oral Supplement Order:  Ensure Plus High Protein  Appetite/Oral Intake: good/% of meals  Factors Affecting Nutritional Intake: none identified  Food/Tenriism/Cultural Preferences: none reported  Food Allergies: no known food allergies       Wound(s):       Comments    11/19/24 pt tolerating oral diet, reports good appetite and intake of meals. On a pureed diet due to recent dental surgery to remove teeth; agreeable to adding oral supplements (chocolate); possibly has had some weight loss after oral surgery, but unsure how much    Anthropometrics    Height: 5' 3" (160 cm) Height Method: Stated  Last Weight: 58.5 kg (129 lb) (11/17/24 1909) Weight Method: Stated  BMI (Calculated): 22.9  BMI Classification: normal (BMI 18.5-24.9)     Ideal Body Weight (IBW), Female: 115 lb     % Ideal Body Weight, Female (lb): 112.17 %                             Usual Weight Provided By: unable to obtain usual weight    Wt Readings from Last 5 Encounters:   11/17/24 58.5 kg (129 lb)     Weight Change(s) Since Admission:  Admit Weight: 58.5 kg (129 lb) (11/17/24 1909)      Patient Education    Not applicable.    Monitoring & Evaluation     Dietitian will monitor food and beverage intake and weight change.  Nutrition Risk/Follow-Up: low (follow-up in 5-7 days)  Patients assigned 'low nutrition risk' status do not qualify for a full nutritional assessment but will be monitored and re-evaluated in a 5-7 day time period. Please consult if re-evaluation needed sooner.   "

## 2024-11-19 NOTE — PLAN OF CARE
Problem: Physical Therapy  Goal: Physical Therapy Goal  Description: Goals to be met by: 24     Patient will increase functional independence with mobility by performin. Supine to sit with Modified Mark  2. Sit to supine with Modified Mark  3. Sit to stand transfer with Modified Mark  4. Bed to chair transfer with Modified Mark using Rolling Walker  5. Gait  x 100 feet with Modified Mark using Rolling Walker.     Outcome: Progressing

## 2024-11-19 NOTE — PLAN OF CARE
Met with pt and family at bedside to discuss d/c POC and rehab placement. All in agreement. Provided list of facilities and FOC. [facility]ed referral be [facility]. Referral sent.    Maty Rankin, LCSW    [id number] Received update from Alva at [facility] stating pt looks like good candidate for rehab placement, just pending PT note. Stated she would call pt's family and complete interview.

## 2024-11-19 NOTE — PT/OT/SLP EVAL
"Occupational Therapy  Evaluation    Name: Elvira Rivers  MRN: [id number]  Admitting Diagnosis: s/p fall with L femur fx  Recent Surgery: Procedure(s) (LRB):  INSERTION, INTRAMEDULLARY HANK, FEMUR (Left) 1 Day Post-Op    Recommendations:     Discharge therapy intensity: High Intensity Therapy   Discharge Equipment Recommendations:   (tbd)  Barriers to discharge:       Assessment:     Elvira Rivers is a 79 y.o. female with a medical diagnosis of s/p fall with L femur fx.  She presents with the following performance deficits affecting function: weakness, impaired endurance, impaired self care skills, impaired functional mobility.     At [facility], pt with excellent effort and motivation, able to ambulate x ~4 ft to bedside chair with Rw and min assist. Pt lives with 81 year old  and would benefit from high intensity therapy prior to return to home to minimize caregiver burden, maximize functional independence and pt safety, and decreased chance of falls and remittance.   Rehab Prognosis: good; patient would benefit from acute skilled OT services to address these deficits and reach maximum level of function.       Plan:     Patient to be seen 6 x/week to address the above listed problems via self-care/home management, therapeutic activities, therapeutic exercises  Plan of Care Expires: 12/17/24  Plan of Care Reviewed with: patient    Subjective     Chief Complaint: "when I pulled on that bar with the suction cups, he came loose"  Patient/Family Comments/goals: get stronger and move easier    Occupational Profile:  Living Environment: lives in  home with 2 steps with , walk in shower with threshold  Previous level of function: independent, no AD used, has shower chair  Roles and Routines: wife, mom  Equipment Used at Home: walker, rolling  Assistance upon Discharge:  but limited amount of physical assist he's able to give    Pain/Comfort:  Pain Rating 1: 0/10 (initially while not " moving)    Patients cultural, spiritual, Protestant conflicts given the current situation:      Objective:     OT communicated with nsg and PT prior to session.      Patient was found supine with   upon OT entry to room.    General Precautions: Standard, fall  Orthopedic Precautions: LLE weight bearing as tolerated  Braces:      Vital Signs:     Bed Mobility:    Sup to sit with mod assist    Functional Mobility/Transfers:  Sit to stand with min assist with RW  Functional Mobility: ambulated x ~4 ft with RW to bedside chair with min assist     Activities of Daily Living:  Socks with total assist    AMPAC 6 Click ADL:  AMPAC Total Score:      Functional Cognition:  intact    Visual Perceptual Skills:  intact    Upper Extremity Function:  Right Upper Extremity:   wfl    Left Upper Extremity:  wfl    Balance:   SBA EOB  Standing with RW with CgA      Patient Education:  Patient and family were provided with verbal education education regarding OT role/goals/POC, post op precautions, and Discharge/DME recommendations.  Understanding was verbalized.     Patient left up in chair with call button in reach, CNA notified, and family present.    GOALS:   Multidisciplinary Problems       Occupational Therapy Goals       Not on file                    History:     No past medical history on file.      Past Surgical History:   Procedure Laterality Date    INTRAMEDULLARY RODDING OF FEMUR Left 11/18/2024    Procedure: INSERTION, INTRAMEDULLARY HANK, FEMUR;  Surgeon: cO Story DO;  Location: [facility];  Service: Orthopedics;  Laterality: Left;  supine hana table c arm synthes long nail TFN       Time Tracking:     OT Date of Treatment: 11/19/24  OT Start Time: 1020  OT Stop Time: 1045  OT Total Time (min): 25 min    Billable Minutes:Evaluation mod complexity    11/19/2024

## 2024-11-19 NOTE — ANESTHESIA POSTPROCEDURE EVALUATION
Anesthesia Post Evaluation    Patient: Elvira Rivers    Procedure(s) Performed: Procedure(s) (LRB):  INSERTION, INTRAMEDULLARY HANK, FEMUR (Left)    Final Anesthesia Type: general      Patient location during evaluation: PACU  Patient participation: Yes- Able to Participate  Level of consciousness: awake and alert  Post-procedure vital signs: reviewed and stable  Pain management: adequate  Airway patency: patent    PONV status at discharge: No PONV  Anesthetic complications: no      Cardiovascular status: blood pressure returned to baseline and hemodynamically stable  Respiratory status: unassisted  Hydration status: euvolemic  Follow-up not needed.              Vitals Value Taken Time   /54 11/18/24    Temp 37 °C (98.6 °F) 11/18/24    Pulse 70 11/18/24    Resp 18 11/18/24    SpO2 99 % 11/18/24          Event Time   Out of Recovery 11/18/2024 10:52:00         Pain/Jose Score: Pain Rating Prior to Med Admin: 5 (11/19/2024  1:50 PM)  Pain Rating Post Med Admin: 3 (11/19/2024  6:29 AM)  Jose Score: 9 (11/18/2024 10:52 AM)

## 2024-11-19 NOTE — PROGRESS NOTES
"   Trauma Surgery   Progress Note  Admit Date: 11/17/2024  HD#2  POD#1 Day Post-Op    Subjective:   Interval history:  Patient POD1 s/p ORIF L femur   AF HDS. Tmax 98.9  Satting 98% RA   Reports LLE pain now controlled.   Patient tolerating regular diet well   Denies n/v   + flatus, -BM ON     Home Meds:  Current Outpatient Medications   Medication Instructions    hydroCHLOROthiazide (HYDRODIURIL) 12.5 mg    levothyroxine (SYNTHROID) 112 mcg    rOPINIRole (REQUIP) 3 MG tablet 1 tablet, Nightly    simvastatin (ZOCOR) 20 mg      Scheduled Meds:   acetaminophen  650 mg Oral Q6H    heparin (porcine)  5,000 Units Subcutaneous Q8H    hydroCHLOROthiazide  12.5 mg Oral Daily    levothyroxine  112 mcg Oral Daily    methocarbamoL  500 mg Oral TID    mupirocin   Nasal BID    polyethylene glycol  17 g Oral Daily    rOPINIRole  3 mg Oral QHS     Continuous Infusions:      PRN Meds:  Current Facility-Administered Medications:     LIDOcaine (PF) 10 mg/ml (1%), 1 mL, Intradermal, Once PRN    melatonin, 6 mg, Oral, Nightly PRN    morphine, 4 mg, Intravenous, Q6H PRN    ondansetron, 8 mg, Oral, Q8H PRN    oxyCODONE, 5 mg, Oral, Q4H PRN    oxyCODONE, 10 mg, Oral, Q4H PRN    promethazine, 25 mg, Oral, Q6H PRN    sodium chloride 0.9%, 10 mL, Intravenous, PRN     Objective:     VITAL SIGNS: 24 HR MIN & MAX LAST   Temp  Min: 97.6 °F (36.4 °C)  Max: 98.9 °F (37.2 °C)  98.3 °F (36.8 °C)   BP  Min: 105/55  Max: 126/62  126/62    Pulse  Min: 59  Max: 74  66    Resp  Min: 16  Max: 18  18    SpO2  Min: 97 %  Max: 100 %  100 %      HT: 5' 3" (160 cm)  WT: 58.5 kg (129 lb)  BMI: 22.9     Intake/output:  Intake/Output - Last 3 Shifts         11/17 0700  11/18 0659 11/18 0700  11/19 0659 11/19 0700 11/20 0659    P.O.  600     IV Piggyback  200     Total Intake(mL/kg)  800 (13.7)     Urine (mL/kg/hr) 1200 1750 (1.2)     Total Output 1200 1750     Net -1200 -950                    Intake/Output Summary (Last 24 hours) at 11/19/2024 1242  Last data " "filed at 11/18/2024 2323  Gross per 24 hour   Intake 600 ml   Output 1750 ml   Net -1150 ml           Lines/drains/airway:       Peripheral IV - Single Lumen 11/17/24 2000 20 G Anterior;Proximal;Right Forearm (Active)   Site Assessment Intact 11/18/24 0400   Line Status Saline locked 11/18/24 0400   Dressing Status Intact 11/18/24 0400   Dressing Intervention Integrity maintained 11/18/24 0400   Number of days: 0            Urethral Catheter 11/17/24 2237 (Active)   Site Assessment Clean;Intact 11/18/24 0013   Collection Container Standard drainage bag 11/18/24 0013   Securement Method secured to top of thigh w/ adhesive device 11/18/24 0013   Catheter Care Performed yes 11/18/24 0013   Reason for Continuing Urinary Catheterization Required immobilization 11/18/24 0013   Output (mL) 600 mL 11/18/24 0013   Number of days: 0       Physical examination:  Gen: NAD, AAOx3, answering questions appropriately  HEENT:  CV: RR  Resp: NWOB  Abd: S/NT/ND  Ext: moving all extremities spontaneously and purposefully. Knee immobilizer, traction to LLE.  Neuro: CN II-XII grossly intact  Skin/wounds:    Labs:  Renal:  Recent Labs     11/17/24 1959 11/18/24 0517 11/18/24 1749 11/19/24 0420   BUN 23.3* 16.8 15.4 17.1   CREATININE 1.41* 1.27* 1.24* 1.02     No results for input(s): "LACTIC" in the last 72 hours.  FENGI:  Recent Labs     11/17/24 1959 11/18/24 0517 11/18/24 1749 11/19/24  0420   * 137 131* 131*   K 4.6 5.4* 4.3 4.2    106 102 101   CO2 21* 25 20* 22*   CALCIUM 9.1 8.6 8.5 8.0*   MG  --  2.20  --  2.00   PHOS  --  4.0  --  3.3   ALBUMIN 4.0  --   --   --    BILITOT 0.3  --   --   --    AST 17  --   --   --    ALKPHOS 56  --   --   --    ALT 13  --   --   --      Heme:  Recent Labs     11/17/24 1959 11/18/24 0517 11/19/24  0420   HGB 11.8* 10.9* 9.2*   HCT 34.7* 32.6* 27.6*    271 218     ID:  Recent Labs     11/17/24 1959 11/18/24 0517 11/19/24 0420   WBC 11.57* 10.77 14.07*     CBG:  Recent " "Labs     11/17/24 1959 11/18/24  0517 11/18/24  1749 11/19/24  0420   GLUCOSE 114 97 162* 115      Cardiovascular:  No results for input(s): "TROPONINI", "CKTOTAL", "CKMB", "BNP" in the last 168 hours.  ABG:  No results for input(s): "PH", "PO2", "PCO2", "HCO3", "BE" in the last 168 hours.   I have reviewed all pertinent lab results within the past 24 hours.    Imaging:  X-Ray Femur 2 View Left   Final Result      Expected internal fixation of the left femur         Electronically signed by: Roc Bernal MD   Date:    11/18/2024   Time:    10:55      SURG FL Surgery Fluoro Usage   Final Result      X-Ray Femur 2 View Left   Final Result      The angulation of the femur fracture is mildly improved.  Bowel         Electronically signed by: Charlie Rubalcava MD   Date:    11/17/2024   Time:    22:45      X-Ray Femur Ap/Lat Left   Final Result      Study showing midshaft femur fracture         Electronically signed by: Charlie Rubalcava MD   Date:    11/17/2024   Time:    20:10      X-Ray Chest AP Portable   Final Result      No acute disease is seen         Electronically signed by: Charlie Rubalcava MD   Date:    11/17/2024   Time:    20:00      X-Ray Pelvis Routine AP   Final Result      No acute abnormalities are seen         Electronically signed by: Charlie Rubalcava MD   Date:    11/17/2024   Time:    20:03         I have reviewed all pertinent imaging results/findings within the past 24 hours.    Micro/Path/Other:  Microbiology Results (last 7 days)       ** No results found for the last 168 hours. **           Pathology Results  (Last 7 days)      None             Assessment & Plan:   Patient is a 79 year old female who presents for LLE pain following mechanical GLF at home. Imaging concerning for midshaft fracture of left femur. Trauma surgery consulted for admission for future orthopedic intervention.     - MMPC  - Regular diet   - Bowel Regimen   - Lovenox, SCDs for DVT ppx  - Anti-emetics PRN  - Home medications  - Daily " labs    Terrie Kebede MD   General Surgery PGY-1  [facility]

## 2024-11-19 NOTE — PT/OT/SLP EVAL
Physical Therapy Evaluation    Patient Name:  Elvira Rivers   MRN:  [id number]    Recommendations:     Discharge therapy intensity: High Intensity Therapy   Discharge Equipment Recommendations: walker, rolling   Barriers to discharge: Impaired mobility    Assessment:     Elvira Rivers is a 79 y.o. female admitted with a medical diagnosis of mechanical GLF, L femoral shaft fx, s/p IMN.  She presents with the following impairments/functional limitations: weakness, gait instability, impaired endurance, impaired balance, decreased safety awareness, impaired functional mobility, pain. The pt tolerated session well. She is able to mobilize to EOB with mod A, and perform stand and ambulate with min A and Rw. The pt c/o significant pain throughout session. She is completely independent at home with spouse. She would greatly benefit from HIGH intensity PT upon dc.     Rehab Prognosis: Good; patient would benefit from acute skilled PT services to address these deficits and reach maximum level of function.    Recent Surgery: Procedure(s) (LRB):  INSERTION, INTRAMEDULLARY HANK, FEMUR (Left) 1 Day Post-Op    Plan:     During this hospitalization, patient would benefit from acute PT services 6 x/week to address the identified rehab impairments via gait training, therapeutic activities, therapeutic exercises and progress toward the following goals:    Plan of Care Expires:  12/19/24    Subjective     Chief Complaint: none  Patient/Family Comments/goals: return to PLOF  Pain/Comfort:  Location - Side 1: Left  Location 1: hip  Pain Addressed 1: Reposition, Distraction, Pre-medicate for activity  Location - Side 2: Left  Location 2: thigh  Pain Addressed 2: Pre-medicate for activity, Reposition, Distraction    Patients cultural, spiritual, Mandaeism conflicts given the current situation:      Living Environment:  Home with spouse,  home, 4 steps to enter, no rail.  Prior to admission, patients level of function was  independent.  Equipment used at home: walker, rolling.  DME owned (not currently used): rolling walker.  Upon discharge, patient will have assistance from spouse.    Objective:     Communicated with NSG prior to session.  Patient found supine with pressure relief boots  upon PT entry to room.    General Precautions: Standard, fall  Orthopedic Precautions:LLE weight bearing as tolerated   Braces: N/A  Respiratory Status: Room air  Blood Pressure: NA      Exams:  RLE Strength: WFL  LLE Strength: NT-surgical limb  Skin integrity:  post-op incision      Functional Mobility:  Bed Mobility:     Scooting: moderate assistance  Supine to Sit: moderate assistance  Transfers:     Sit to Stand:  minimum assistance with rolling walker  Gait: pt ambulates x 5 feet with min A and RW, antalgic gait    Patient and family were provided with verbal education education regarding PT role/goals/POC, safety awareness, and discharge/DME recommendations.  Understanding was verbalized.     Patient left up in chair with all lines intact, call button in reach, and NSG notified.    GOALS:   Multidisciplinary Problems       Physical Therapy Goals          Problem: Physical Therapy    Goal Priority Disciplines Outcome Interventions   Physical Therapy Goal     PT, PT/OT Progressing    Description: Goals to be met by: 24     Patient will increase functional independence with mobility by performin. Supine to sit with Modified [place]  2. Sit to supine with Modified [place]  3. Sit to stand transfer with Modified [place]  4. Bed to chair transfer with Modified [place] using Rolling Walker  5. Gait  x 100 feet with Modified Kay using Rolling Walker.                          History:     No past medical history on file.    Past Surgical History:   Procedure Laterality Date    INTRAMEDULLARY RODDING OF FEMUR Left 2024    Procedure: INSERTION, INTRAMEDULLARY HANK, FEMUR;  Surgeon: Oc Story, DO;   Location: [facility];  Service: Orthopedics;  Laterality: Left;  supine hana table c arm synthes long nail TFN       Time Tracking:     PT Received On: 11/19/24  PT Start Time: 1019     PT Stop Time: 1044  PT Total Time (min): 25 min     Billable Minutes: Evaluation 15 and Gait Training 10      11/19/2024

## 2024-11-20 LAB
ANION GAP SERPL CALC-SCNC: 8 MEQ/L
BUN SERPL-MCNC: 21.4 MG/DL (ref 9.8–20.1)
CALCIUM SERPL-MCNC: 8.4 MG/DL (ref 8.4–10.2)
CHLORIDE SERPL-SCNC: 99 MMOL/L (ref 98–107)
CO2 SERPL-SCNC: 26 MMOL/L (ref 23–31)
CREAT SERPL-MCNC: 0.84 MG/DL (ref 0.55–1.02)
CREAT/UREA NIT SERPL: 25
GFR SERPLBLD CREATININE-BSD FMLA CKD-EPI: >60 ML/MIN/1.73/M2
GLUCOSE SERPL-MCNC: 105 MG/DL (ref 82–115)
POTASSIUM SERPL-SCNC: 4.8 MMOL/L (ref 3.5–5.1)
SODIUM SERPL-SCNC: 133 MMOL/L (ref 136–145)

## 2024-11-20 PROCEDURE — 63600175 PHARM REV CODE 636 W HCPCS

## 2024-11-20 PROCEDURE — 80048 BASIC METABOLIC PNL TOTAL CA: CPT

## 2024-11-20 PROCEDURE — 25000003 PHARM REV CODE 250: Performed by: STUDENT IN AN ORGANIZED HEALTH CARE EDUCATION/TRAINING PROGRAM

## 2024-11-20 PROCEDURE — 36415 COLL VENOUS BLD VENIPUNCTURE: CPT

## 2024-11-20 PROCEDURE — 97116 GAIT TRAINING THERAPY: CPT

## 2024-11-20 PROCEDURE — 25000003 PHARM REV CODE 250

## 2024-11-20 PROCEDURE — 21400001 HC TELEMETRY ROOM

## 2024-11-20 PROCEDURE — 51798 US URINE CAPACITY MEASURE: CPT

## 2024-11-20 RX ORDER — ROPINIROLE 0.25 MG/1
0.25 TABLET, FILM COATED ORAL ONCE
Status: DISCONTINUED | OUTPATIENT
Start: 2024-11-24 | End: 2024-11-20

## 2024-11-20 RX ORDER — POLYETHYLENE GLYCOL 3350 17 G/17G
17 POWDER, FOR SOLUTION ORAL 2 TIMES DAILY
Status: DISCONTINUED | OUTPATIENT
Start: 2024-11-20 | End: 2024-11-21 | Stop reason: HOSPADM

## 2024-11-20 RX ORDER — TAMSULOSIN HYDROCHLORIDE 0.4 MG/1
0.4 CAPSULE ORAL DAILY
Status: DISCONTINUED | OUTPATIENT
Start: 2024-11-20 | End: 2024-11-21 | Stop reason: HOSPADM

## 2024-11-20 RX ORDER — ROPINIROLE 1 MG/1
3 TABLET, FILM COATED ORAL ONCE
Status: DISCONTINUED | OUTPATIENT
Start: 2024-11-24 | End: 2024-11-21 | Stop reason: HOSPADM

## 2024-11-20 RX ORDER — ROPINIROLE 1 MG/1
3 TABLET, FILM COATED ORAL NIGHTLY
Status: COMPLETED | OUTPATIENT
Start: 2024-11-20 | End: 2024-11-20

## 2024-11-20 RX ORDER — ROPINIROLE 1 MG/1
3 TABLET, FILM COATED ORAL ONCE
Status: DISCONTINUED | OUTPATIENT
Start: 2024-11-23 | End: 2024-11-21 | Stop reason: HOSPADM

## 2024-11-20 RX ORDER — ROPINIROLE 1 MG/1
3 TABLET, FILM COATED ORAL DAILY
Status: DISCONTINUED | OUTPATIENT
Start: 2024-11-21 | End: 2024-11-21 | Stop reason: HOSPADM

## 2024-11-20 RX ORDER — ROPINIROLE 1 MG/1
3 TABLET, FILM COATED ORAL ONCE
Status: DISCONTINUED | OUTPATIENT
Start: 2024-11-22 | End: 2024-11-21 | Stop reason: HOSPADM

## 2024-11-20 RX ADMIN — ROPINIROLE HYDROCHLORIDE 3 MG: 1 TABLET, FILM COATED ORAL at 06:11

## 2024-11-20 RX ADMIN — METHOCARBAMOL 500 MG: 500 TABLET ORAL at 08:11

## 2024-11-20 RX ADMIN — MUPIROCIN: 20 OINTMENT TOPICAL at 08:11

## 2024-11-20 RX ADMIN — HYDROCHLOROTHIAZIDE 12.5 MG: 12.5 TABLET ORAL at 08:11

## 2024-11-20 RX ADMIN — ACETAMINOPHEN 650 MG: 325 TABLET, FILM COATED ORAL at 12:11

## 2024-11-20 RX ADMIN — ACETAMINOPHEN 650 MG: 325 TABLET, FILM COATED ORAL at 02:11

## 2024-11-20 RX ADMIN — METHOCARBAMOL 500 MG: 500 TABLET ORAL at 09:11

## 2024-11-20 RX ADMIN — HEPARIN SODIUM 5000 UNITS: 5000 INJECTION, SOLUTION INTRAVENOUS; SUBCUTANEOUS at 09:11

## 2024-11-20 RX ADMIN — LEVOTHYROXINE SODIUM 112 MCG: 112 TABLET ORAL at 08:11

## 2024-11-20 RX ADMIN — POLYETHYLENE GLYCOL 3350 17 G: 17 POWDER, FOR SOLUTION ORAL at 08:11

## 2024-11-20 RX ADMIN — TAMSULOSIN HYDROCHLORIDE 0.4 MG: 0.4 CAPSULE ORAL at 08:11

## 2024-11-20 RX ADMIN — METHOCARBAMOL 500 MG: 500 TABLET ORAL at 02:11

## 2024-11-20 RX ADMIN — MUPIROCIN: 20 OINTMENT TOPICAL at 09:11

## 2024-11-20 RX ADMIN — SENNOSIDES 8.6 MG: 8.6 TABLET, FILM COATED ORAL at 09:11

## 2024-11-20 RX ADMIN — SENNOSIDES 8.6 MG: 8.6 TABLET, FILM COATED ORAL at 08:11

## 2024-11-20 RX ADMIN — ACETAMINOPHEN 650 MG: 325 TABLET, FILM COATED ORAL at 05:11

## 2024-11-20 RX ADMIN — OXYCODONE HYDROCHLORIDE 5 MG: 5 TABLET ORAL at 02:11

## 2024-11-20 RX ADMIN — OXYCODONE HYDROCHLORIDE 10 MG: 10 TABLET ORAL at 12:11

## 2024-11-20 RX ADMIN — OXYCODONE HYDROCHLORIDE 10 MG: 10 TABLET ORAL at 05:11

## 2024-11-20 RX ADMIN — HEPARIN SODIUM 5000 UNITS: 5000 INJECTION, SOLUTION INTRAVENOUS; SUBCUTANEOUS at 02:11

## 2024-11-20 RX ADMIN — HEPARIN SODIUM 5000 UNITS: 5000 INJECTION, SOLUTION INTRAVENOUS; SUBCUTANEOUS at 05:11

## 2024-11-20 NOTE — PT/OT/SLP PROGRESS
Physical Therapy Treatment    Patient Name:  Elvira Rivers   MRN:  [id number]    Recommendations:     Discharge therapy intensity: High Intensity Therapy   Discharge Equipment Recommendations: walker, rolling  Barriers to discharge: Impaired mobility    Assessment:     Elvira Rivers is a 79 y.o. female admitted with a medical diagnosis of mechanical GLF, L femoral shaft fx, s/p IMN.  She presents with the following impairments/functional limitations: weakness, gait instability, impaired balance, impaired endurance, decreased safety awareness, impaired functional mobility. The pt tolerated session well. The pt is able to ambulate increased distance today, 30 feet with min A and RW.    Rehab Prognosis: Good; patient would benefit from acute skilled PT services to address these deficits and reach maximum level of function.    Recent Surgery: Procedure(s) (LRB):  INSERTION, INTRAMEDULLARY HANK, FEMUR (Left) 2 Days Post-Op    Plan:     During this hospitalization, patient would benefit from acute PT services 6 x/week to address the identified rehab impairments via gait training, therapeutic activities, therapeutic exercises and progress toward the following goals:    Plan of Care Expires:  12/19/24    Subjective     Chief Complaint: none  Patient/Family Comments/goals: return to PLOF  Pain/Comfort:  Location - Side 1: Left  Location 1: hip  Pain Addressed 1: Reposition, Pre-medicate for activity, Distraction      Objective:     Communicated with NSG prior to session.  Patient found supine with pressure relief boots upon PT entry to room.     General Precautions: Standard, fall  Orthopedic Precautions: LLE weight bearing as tolerated  Braces: N/A  Respiratory Status: Room air  Blood Pressure: NA  Skin Integrity: Visible skin intact      Functional Mobility:  Bed Mobility:     Supine to Sit: moderate assistance  Transfers:     Sit to Stand:  minimum assistance with rolling walker  Gait: pt ambulates x 30 feet  with min A and RW, multiple standing rest breaks, decreased demetria, used UEs to off weight LLE.    Education:  Patient provided with verbal education education regarding PT role/goals/POC, safety awareness, and discharge/DME recommendations.  Understanding was verbalized.     Patient left supine with all lines intact, call button in reach, and NSG notified    GOALS:   Multidisciplinary Problems       Physical Therapy Goals          Problem: Physical Therapy    Goal Priority Disciplines Outcome Interventions   Physical Therapy Goal     PT, PT/OT Progressing    Description: Goals to be met by: 24     Patient will increase functional independence with mobility by performin. Supine to sit with Modified [place]  2. Sit to supine with Modified [place]  3. Sit to stand transfer with Modified [place]  4. Bed to chair transfer with Modified [place] using Rolling Walker  5. Gait  x 100 feet with Modified [place] using Rolling Walker.                          Time Tracking:     PT Received On: 24  PT Start Time: 1341     PT Stop Time: 1410  PT Total Time (min): 29 min     Billable Minutes: Gait Training 29    Treatment Type: Treatment  PT/PTA: PT     Number of PTA visits since last PT visit: 1     2024

## 2024-11-20 NOTE — PROGRESS NOTES
"[facility]  [facility]    Patient Name: Elvira Rivers  MRN: [id number]  Admission Date: 11/17/2024  Hospital Length of Stay: 3 days  Attending Provider: Guilherme Umanzor MD  Primary Care Provider: Aruna Goncalves FNP-C    Subjective:     Principal Problem:Closed fracture of left femur    Principal Orthopedic Problem: 2 Days Post-Op   IMN L femur fracture    Interval History: Daughter remains bedside. Doing well this morning. Pain worse overnight due to restless leg syndrome and increased movement. Restarted meds for this. Dressing CDI this morning. Working well with physical therapy. No numbness or tingling distally. Working with therapy. Planning for rehab DC.     Review of patient's allergies indicates:  No Known Allergies    Current Facility-Administered Medications   Medication    acetaminophen tablet 650 mg    heparin (porcine) injection 5,000 Units    hydroCHLOROthiazide tablet 12.5 mg    levothyroxine tablet 112 mcg    LIDOcaine (PF) 10 mg/ml (1%) injection 10 mg    melatonin tablet 6 mg    methocarbamoL tablet 500 mg    mupirocin 2 % ointment    ondansetron disintegrating tablet 8 mg    oxyCODONE immediate release tablet 5 mg    oxyCODONE immediate release tablet Tab 10 mg    polyethylene glycol packet 17 g    promethazine tablet 25 mg    rOPINIRole tablet 3 mg    senna tablet 8.6 mg    sodium chloride 0.9% flush 10 mL    tamsulosin 24 hr capsule 0.4 mg     Objective:     Vital Signs (Most Recent):  Temp: 98.3 °F (36.8 °C) (11/20/24 0728)  Pulse: 69 (11/20/24 0728)  Resp: 18 (11/20/24 0728)  BP: (!) 108/55 (11/20/24 0728)  SpO2: 97 % (11/20/24 0728) Vital Signs (24h Range):  Temp:  [98.2 °F (36.8 °C)-98.6 °F (37 °C)] 98.3 °F (36.8 °C)  Pulse:  [66-76] 69  Resp:  [17-18] 18  SpO2:  [95 %-100 %] 97 %  BP: (108-132)/(55-71) 108/55     Weight: 58.5 kg (129 lb)  Height: 5' 3" (160 cm)  Body mass index is 22.85 kg/m².      Intake/Output Summary (Last 24 hours) " at 11/20/2024 0822  Last data filed at 11/20/2024 0048  Gross per 24 hour   Intake 480 ml   Output 1571 ml   Net -1091 ml       Physical Exam:   General the patient is alert and in no acute distress;  nontoxic-appearing appropriate affect.    Constitutional: Vital signs are examined and stable.  Resp: No signs of labored breathing               LLE: -Skin: Dressing CDI           -MSK: +Hip and Knee F/E, +EHL/FHL, + DF/PF; tolerates full passive circumduction of the hip.           -Neuro:  Sensation intact to light touch throughout           -CV: Capillary refill is less than 2 seconds. +DP. Compartments soft and compressible      Diagnostic Findings:     Significant Labs: CBC:   Recent Labs   Lab 11/19/24  0420   WBC 14.07*   HGB 9.2*   HCT 27.6*        All pertinent labs within the past 24 hours have been reviewed.    Significant Imaging: I have reviewed all pertinent imaging results/findings.     Assessment/Plan:     Active Diagnoses:    Diagnosis Date Noted POA    PRINCIPAL PROBLEM:  Closed fracture of left femur [S72.92XA] 11/17/2024 Yes    Fall in shower [W18.2XXA] 11/19/2024 Yes    Stage 3b chronic kidney disease [N18.32] 11/19/2024 Yes      Problems Resolved During this Admission:   78 Yo F POD #2 IMN L femur    Diet: As tolerated  Pain: multimodal PRN  DVT: Heparin;  OK for ASA 81mg BID on DC x30D. If on heparin outpatient, may continue this for DVT ppx.   ABX: periop ancef completed  ABLA; expected for fracture . Stable  PT/OT: Eval and Treat  Activity: WBAT LLE  Dry dressing changes with islands today. May leave open to air if no drainage.   CM for DC needs; rehab placement planned.   Follow up with Haily Mccormack PA-C/ Dr. Oc Story in clinic in 3 weeks for wound check and repeat evaluation.   Ortho stable for DC to rehab when ready. Will continue to follow peripherally during her stay. Please call with questions or concerns.     The above findings, diagnostics, and treatment plan were  discussed with Dr Story who is in agreement with the plan of care except as stated in additional documentation.      Haily Mccormack PA-C   Orthopedic Trauma Surgery  [facility]

## 2024-11-20 NOTE — PROGRESS NOTES
"   Trauma Surgery   Progress Note  Admit Date: 11/17/2024  HD#3  POD#2 Days Post-Op    Subjective:   Interval history:  Patient POD2 s/p ORIF L femur   Had some intense mm cramps prior 2/2 RLS before bed, requested her ropinirole daily at 4pm to prevent these spasms    AF HDS. Tmax 98.6  Satting 98% RA   Reports LLE pain now controlled  Patient tolerating regular diet well   Denies n/v   + flatus, -BM ON   I&O cath x1 600mL, pt states that urinary retention is a chronic issue for her     Home Meds:  Current Outpatient Medications   Medication Instructions    hydroCHLOROthiazide (HYDRODIURIL) 12.5 mg    levothyroxine (SYNTHROID) 112 mcg    rOPINIRole (REQUIP) 3 MG tablet 1 tablet, Nightly    simvastatin (ZOCOR) 20 mg      Scheduled Meds:   acetaminophen  650 mg Oral Q6H    heparin (porcine)  5,000 Units Subcutaneous Q8H    hydroCHLOROthiazide  12.5 mg Oral Daily    levothyroxine  112 mcg Oral Daily    methocarbamoL  500 mg Oral TID    mupirocin   Nasal BID    polyethylene glycol  17 g Oral BID    rOPINIRole  3 mg Oral QHS    [START ON 11/21/2024] rOPINIRole  3 mg Oral Daily    senna  8.6 mg Oral BID    tamsulosin  0.4 mg Oral Daily     Continuous Infusions:      PRN Meds:  Current Facility-Administered Medications:     LIDOcaine (PF) 10 mg/ml (1%), 1 mL, Intradermal, Once PRN    melatonin, 6 mg, Oral, Nightly PRN    ondansetron, 8 mg, Oral, Q8H PRN    oxyCODONE, 5 mg, Oral, Q4H PRN    oxyCODONE, 10 mg, Oral, Q4H PRN    promethazine, 25 mg, Oral, Q6H PRN    sodium chloride 0.9%, 10 mL, Intravenous, PRN     Objective:     VITAL SIGNS: 24 HR MIN & MAX LAST   Temp  Min: 98.2 °F (36.8 °C)  Max: 98.6 °F (37 °C)  98.3 °F (36.8 °C)   BP  Min: 108/55  Max: 132/71  (!) 108/55    Pulse  Min: 66  Max: 76  69    Resp  Min: 17  Max: 18  18    SpO2  Min: 95 %  Max: 100 %  97 %      HT: 5' 3" (160 cm)  WT: 58.5 kg (129 lb)  BMI: 22.9     Intake/output:  Intake/Output - Last 3 Shifts         11/18 0700 11/19 0659 11/19 0700 11/20 " "0659 11/20 0700  11/21 0659    P.O. 600 720     IV Piggyback 200      Total Intake(mL/kg) 800 (13.7) 720 (12.3)     Urine (mL/kg/hr) 1750 (1.2) 1571 (1.1)     Total Output 1750 1571     Net -950 -851                    Intake/Output Summary (Last 24 hours) at 11/20/2024 0956  Last data filed at 11/20/2024 0048  Gross per 24 hour   Intake 480 ml   Output 1571 ml   Net -1091 ml           Lines/drains/airway:       Peripheral IV - Single Lumen 11/17/24 2000 20 G Anterior;Proximal;Right Forearm (Active)   Site Assessment Intact 11/18/24 0400   Line Status Saline locked 11/18/24 0400   Dressing Status Intact 11/18/24 0400   Dressing Intervention Integrity maintained 11/18/24 0400   Number of days: 0            Urethral Catheter 11/17/24 2237 (Active)   Site Assessment Clean;Intact 11/18/24 0013   Collection Container Standard drainage bag 11/18/24 0013   Securement Method secured to top of thigh w/ adhesive device 11/18/24 0013   Catheter Care Performed yes 11/18/24 0013   Reason for Continuing Urinary Catheterization Required immobilization 11/18/24 0013   Output (mL) 600 mL 11/18/24 0013   Number of days: 0       Physical examination:  Gen: NAD, AAOx3, answering questions appropriately  HEENT: Atraumatic   CV: RR  Resp: NWOB  Abd: S/NT/ND  Ext: moving all extremities spontaneously and purposefully.   Neuro: CN II-XII grossly intact  Skin/wounds: LLE with small dressing over incision to superior-lateral and posterior thigh without strikethrough     Labs:  Renal:  Recent Labs     11/18/24 0517 11/18/24 1749 11/19/24 0420 11/20/24  0550   BUN 16.8 15.4 17.1 21.4*   CREATININE 1.27* 1.24* 1.02 0.84     No results for input(s): "LACTIC" in the last 72 hours.  FENGI:  Recent Labs     11/17/24 1959 11/18/24 0517 11/18/24 1749 11/19/24  0420 11/20/24  0550   * 137 131* 131* 133*   K 4.6 5.4* 4.3 4.2 4.8    106 102 101 99   CO2 21* 25 20* 22* 26   CALCIUM 9.1 8.6 8.5 8.0* 8.4   MG  --  2.20  --  2.00  --  " "  PHOS  --  4.0  --  3.3  --    ALBUMIN 4.0  --   --   --   --    BILITOT 0.3  --   --   --   --    AST 17  --   --   --   --    ALKPHOS 56  --   --   --   --    ALT 13  --   --   --   --      Heme:  Recent Labs     11/17/24 1959 11/18/24 0517 11/19/24 0420   HGB 11.8* 10.9* 9.2*   HCT 34.7* 32.6* 27.6*    271 218     ID:  Recent Labs     11/17/24 1959 11/18/24 0517 11/19/24 0420   WBC 11.57* 10.77 14.07*     CBG:  Recent Labs     11/18/24 0517 11/18/24 1749 11/19/24 0420 11/20/24  0550   GLUCOSE 97 162* 115 105      Cardiovascular:  No results for input(s): "TROPONINI", "CKTOTAL", "CKMB", "BNP" in the last 168 hours.  ABG:  No results for input(s): "PH", "PO2", "PCO2", "HCO3", "BE" in the last 168 hours.   I have reviewed all pertinent lab results within the past 24 hours.    Imaging:  X-Ray Femur 2 View Left   Final Result      Expected internal fixation of the left femur         Electronically signed by: Roc Bernal MD   Date:    11/18/2024   Time:    10:55      SURG FL Surgery Fluoro Usage   Final Result      X-Ray Femur 2 View Left   Final Result      The angulation of the femur fracture is mildly improved.  Bowel         Electronically signed by: Charlie Rubalcava MD   Date:    11/17/2024   Time:    22:45      X-Ray Femur Ap/Lat Left   Final Result      Study showing midshaft femur fracture         Electronically signed by: Charlie Rubalcava MD   Date:    11/17/2024   Time:    20:10      X-Ray Chest AP Portable   Final Result      No acute disease is seen         Electronically signed by: Charlie Rubalcava MD   Date:    11/17/2024   Time:    20:00      X-Ray Pelvis Routine AP   Final Result      No acute abnormalities are seen         Electronically signed by: Charlie Rubalcava MD   Date:    11/17/2024   Time:    20:03         I have reviewed all pertinent imaging results/findings within the past 24 hours.    Micro/Path/Other:  Microbiology Results (last 7 days)       ** No results found for the last 168 " hours. **           Pathology Results  (Last 7 days)      None             Assessment & Plan:   Patient is a 79 year old female who presents for LLE pain following mechanical GLF at home. Imaging concerning for midshaft fracture of left femur. Trauma surgery consulted for admission for future orthopedic intervention. S/p IMN L femur.    - Ropinirole ordered for 4pm daily  - Continue working well with PT/OT   - Dispo pending ortho rehab placement   - Dry dressing changes with islands today, can leave ASHLEY if dry   - MMPC  - Regular diet   - Bowel Regimen   - Lovenox, SCDs for DVT ppx  - Anti-emetics PRN  - Home medications  - Daily labs    Terrie Kebede MD   General Surgery PGY-1  [facility]

## 2024-11-20 NOTE — PT/OT/SLP PROGRESS
"Occupational Therapy      Patient Name:  Elvira Rivers   MRN:  [id number]    OT attempted to see patient 3x today for therapy; nurse notified    First attempt at 0938: patient and family requesting to allow patient to rest due to her not sleeping last night  Second attempt at 1345: patient working with PT and agreed to OT "later on today"  Third attempt at 1557: patient up in chair stating that she worked well with PT and is not interested in participating again. Patient was educated on benefits of mobility and was offered several different treatment options, but declined all stating "I'll do that later with the nurses if I need to."  Will follow-up when schedule allows.    11/20/2024  "

## 2024-11-20 NOTE — PLAN OF CARE
Updates sent to [facility]. Pending update from Alva in admissions as to whether or not they will be able to accept pt.     Maty Rankin, LCSW    1010 F/u with Alva at [facility] requesting update on referral. Stated she needs to speak with her DON and will let me know if they can admit today.     1030 Received update from Alva stating they can admit pt in the AM. Notified trauma NP.

## 2024-11-21 VITALS
SYSTOLIC BLOOD PRESSURE: 138 MMHG | HEIGHT: 63 IN | TEMPERATURE: 98 F | BODY MASS INDEX: 22.86 KG/M2 | DIASTOLIC BLOOD PRESSURE: 49 MMHG | RESPIRATION RATE: 18 BRPM | OXYGEN SATURATION: 97 % | WEIGHT: 129 LBS | HEART RATE: 80 BPM

## 2024-11-21 LAB
ALBUMIN SERPL-MCNC: 2.7 G/DL (ref 3.4–4.8)
ALBUMIN/GLOB SERPL: 1 RATIO (ref 1.1–2)
ALP SERPL-CCNC: 69 UNIT/L (ref 40–150)
ALT SERPL-CCNC: 17 UNIT/L (ref 0–55)
ANION GAP SERPL CALC-SCNC: 7 MEQ/L
AST SERPL-CCNC: 37 UNIT/L (ref 5–34)
BASOPHILS # BLD AUTO: 0.08 X10(3)/MCL
BASOPHILS NFR BLD AUTO: 0.9 %
BILIRUB SERPL-MCNC: 0.3 MG/DL
BUN SERPL-MCNC: 24.8 MG/DL (ref 9.8–20.1)
CALCIUM SERPL-MCNC: 8.2 MG/DL (ref 8.4–10.2)
CHLORIDE SERPL-SCNC: 98 MMOL/L (ref 98–107)
CO2 SERPL-SCNC: 25 MMOL/L (ref 23–31)
CREAT SERPL-MCNC: 0.76 MG/DL (ref 0.55–1.02)
CREAT/UREA NIT SERPL: 33
CRP SERPL-MCNC: 55.3 MG/L
EOSINOPHIL # BLD AUTO: 0.1 X10(3)/MCL (ref 0–0.9)
EOSINOPHIL NFR BLD AUTO: 1.1 %
ERYTHROCYTE [DISTWIDTH] IN BLOOD BY AUTOMATED COUNT: 13.1 % (ref 11.5–17)
GFR SERPLBLD CREATININE-BSD FMLA CKD-EPI: >60 ML/MIN/1.73/M2
GLOBULIN SER-MCNC: 2.7 GM/DL (ref 2.4–3.5)
GLUCOSE SERPL-MCNC: 120 MG/DL (ref 82–115)
HCT VFR BLD AUTO: 26.2 % (ref 37–47)
HGB BLD-MCNC: 8.9 G/DL (ref 12–16)
IMM GRANULOCYTES # BLD AUTO: 0.04 X10(3)/MCL (ref 0–0.04)
IMM GRANULOCYTES NFR BLD AUTO: 0.5 %
LYMPHOCYTES # BLD AUTO: 1.42 X10(3)/MCL (ref 0.6–4.6)
LYMPHOCYTES NFR BLD AUTO: 16.1 %
MAGNESIUM SERPL-MCNC: 1.8 MG/DL (ref 1.6–2.6)
MCH RBC QN AUTO: 30.3 PG (ref 27–31)
MCHC RBC AUTO-ENTMCNC: 34 G/DL (ref 33–36)
MCV RBC AUTO: 89.1 FL (ref 80–94)
MONOCYTES # BLD AUTO: 0.93 X10(3)/MCL (ref 0.1–1.3)
MONOCYTES NFR BLD AUTO: 10.6 %
NEUTROPHILS # BLD AUTO: 6.24 X10(3)/MCL (ref 2.1–9.2)
NEUTROPHILS NFR BLD AUTO: 70.8 %
NRBC BLD AUTO-RTO: 0 %
PHOSPHATE SERPL-MCNC: 3.2 MG/DL (ref 2.3–4.7)
PLATELET # BLD AUTO: 234 X10(3)/MCL (ref 130–400)
PMV BLD AUTO: 10.2 FL (ref 7.4–10.4)
POTASSIUM SERPL-SCNC: 4.3 MMOL/L (ref 3.5–5.1)
PREALB SERPL-MCNC: 16 MG/DL (ref 14–37)
PROT SERPL-MCNC: 5.4 GM/DL (ref 5.8–7.6)
RBC # BLD AUTO: 2.94 X10(6)/MCL (ref 4.2–5.4)
SODIUM SERPL-SCNC: 130 MMOL/L (ref 136–145)
WBC # BLD AUTO: 8.81 X10(3)/MCL (ref 4.5–11.5)

## 2024-11-21 PROCEDURE — 85025 COMPLETE CBC W/AUTO DIFF WBC: CPT

## 2024-11-21 PROCEDURE — 83735 ASSAY OF MAGNESIUM: CPT

## 2024-11-21 PROCEDURE — 25000003 PHARM REV CODE 250: Performed by: STUDENT IN AN ORGANIZED HEALTH CARE EDUCATION/TRAINING PROGRAM

## 2024-11-21 PROCEDURE — 36415 COLL VENOUS BLD VENIPUNCTURE: CPT

## 2024-11-21 PROCEDURE — 25000003 PHARM REV CODE 250

## 2024-11-21 PROCEDURE — 99900035 HC TECH TIME PER 15 MIN (STAT)

## 2024-11-21 PROCEDURE — 63600175 PHARM REV CODE 636 W HCPCS

## 2024-11-21 PROCEDURE — 84134 ASSAY OF PREALBUMIN: CPT

## 2024-11-21 PROCEDURE — 94799 UNLISTED PULMONARY SVC/PX: CPT

## 2024-11-21 PROCEDURE — 84100 ASSAY OF PHOSPHORUS: CPT

## 2024-11-21 PROCEDURE — 80053 COMPREHEN METABOLIC PANEL: CPT

## 2024-11-21 PROCEDURE — 86140 C-REACTIVE PROTEIN: CPT

## 2024-11-21 RX ORDER — OXYCODONE HYDROCHLORIDE 5 MG/1
5 TABLET ORAL EVERY 4 HOURS PRN
Start: 2024-11-21 | End: 2024-11-26

## 2024-11-21 RX ORDER — ACETAMINOPHEN 325 MG/1
650 TABLET ORAL EVERY 6 HOURS
Start: 2024-11-21 | End: 2024-12-01

## 2024-11-21 RX ORDER — POLYETHYLENE GLYCOL 3350 17 G/17G
17 POWDER, FOR SOLUTION ORAL 2 TIMES DAILY
Start: 2024-11-21 | End: 2024-12-01

## 2024-11-21 RX ORDER — ASPIRIN 81 MG/1
81 TABLET ORAL 2 TIMES DAILY
Start: 2024-11-21 | End: 2024-12-21

## 2024-11-21 RX ORDER — SENNOSIDES 8.6 MG/1
1 TABLET ORAL 2 TIMES DAILY
Start: 2024-11-21 | End: 2024-12-01

## 2024-11-21 RX ORDER — ONDANSETRON 8 MG/1
8 TABLET, ORALLY DISINTEGRATING ORAL EVERY 8 HOURS PRN
Start: 2024-11-21 | End: 2024-12-01

## 2024-11-21 RX ADMIN — ACETAMINOPHEN 650 MG: 325 TABLET, FILM COATED ORAL at 05:11

## 2024-11-21 RX ADMIN — MUPIROCIN: 20 OINTMENT TOPICAL at 08:11

## 2024-11-21 RX ADMIN — TAMSULOSIN HYDROCHLORIDE 0.4 MG: 0.4 CAPSULE ORAL at 08:11

## 2024-11-21 RX ADMIN — OXYCODONE HYDROCHLORIDE 5 MG: 5 TABLET ORAL at 08:11

## 2024-11-21 RX ADMIN — HYDROCHLOROTHIAZIDE 12.5 MG: 12.5 TABLET ORAL at 08:11

## 2024-11-21 RX ADMIN — LEVOTHYROXINE SODIUM 112 MCG: 112 TABLET ORAL at 08:11

## 2024-11-21 RX ADMIN — HEPARIN SODIUM 5000 UNITS: 5000 INJECTION, SOLUTION INTRAVENOUS; SUBCUTANEOUS at 05:11

## 2024-11-21 RX ADMIN — SENNOSIDES 8.6 MG: 8.6 TABLET, FILM COATED ORAL at 08:11

## 2024-11-21 RX ADMIN — POLYETHYLENE GLYCOL 3350 17 G: 17 POWDER, FOR SOLUTION ORAL at 08:11

## 2024-11-21 RX ADMIN — METHOCARBAMOL 500 MG: 500 TABLET ORAL at 08:11

## 2024-11-21 NOTE — PLAN OF CARE
[facility] sent all DC information to [facility] via [employer].They will set up transportation via [employer]. DC packet given to nurse for report, they will call our nurse for report.

## 2024-11-21 NOTE — PROGRESS NOTES
"[facility]  [facility] Note    Patient Name: Elvira Rivers  MRN: [id number]  Admission Date: 11/17/2024  Hospital Length of Stay: 4 days  Attending Provider: Guilherme Umanzor MD  Primary Care Provider: Aruna Goncalves FNP-C    Subjective:     Principal Problem:Closed fracture of left femur    Principal Orthopedic Problem: 3 Days Post-Op   IMN L femur fracture    Interval History: Daughter remains bedside. Doing well and mobilizing with therapy. Awating DC to rehab.     Review of patient's allergies indicates:  No Known Allergies    Current Facility-Administered Medications   Medication    acetaminophen tablet 650 mg    heparin (porcine) injection 5,000 Units    hydroCHLOROthiazide tablet 12.5 mg    levothyroxine tablet 112 mcg    LIDOcaine (PF) 10 mg/ml (1%) injection 10 mg    melatonin tablet 6 mg    methocarbamoL tablet 500 mg    mupirocin 2 % ointment    ondansetron disintegrating tablet 8 mg    oxyCODONE immediate release tablet 5 mg    oxyCODONE immediate release tablet Tab 10 mg    polyethylene glycol packet 17 g    promethazine tablet 25 mg    rOPINIRole tablet 3 mg    [START ON 11/22/2024] rOPINIRole tablet 3 mg    [START ON 11/23/2024] rOPINIRole tablet 3 mg    [START ON 11/24/2024] rOPINIRole tablet 3 mg    senna tablet 8.6 mg    sodium chloride 0.9% flush 10 mL    tamsulosin 24 hr capsule 0.4 mg     Objective:     Vital Signs (Most Recent):  Temp: 97.9 °F (36.6 °C) (11/21/24 0753)  Pulse: 80 (11/21/24 0753)  Resp: 18 (11/21/24 0832)  BP: (!) 138/49 (11/21/24 0829)  SpO2: 97 % (11/21/24 0753) Vital Signs (24h Range):  Temp:  [97.9 °F (36.6 °C)-98.5 °F (36.9 °C)] 97.9 °F (36.6 °C)  Pulse:  [72-96] 80  Resp:  [16-18] 18  SpO2:  [95 %-99 %] 97 %  BP: ()/(46-59) 138/49     Weight: 58.5 kg (129 lb)  Height: 5' 3" (160 cm)  Body mass index is 22.85 kg/m².      Intake/Output Summary (Last 24 hours) at 11/21/2024 1023  Last data filed at 11/20/2024 1139  Gross " per 24 hour   Intake --   Output 825 ml   Net -825 ml       Physical Exam:   General the patient is alert and in no acute distress;  nontoxic-appearing appropriate affect.    Constitutional: Vital signs are examined and stable.  Resp: No signs of labored breathing               LLE: -Skin: incisions open to air, staples in tact.            -MSK: +Hip and Knee F/E, +EHL/FHL, + DF/PF; tolerates full passive circumduction of the hip.           -Neuro:  Sensation intact to light touch throughout           -CV: Capillary refill is less than 2 seconds. +DP. Compartments soft and compressible      Diagnostic Findings:     Significant Labs: CBC:   Recent Labs   Lab 11/21/24  0440   WBC 8.81   HGB 8.9*   HCT 26.2*        All pertinent labs within the past 24 hours have been reviewed.    Significant Imaging: I have reviewed all pertinent imaging results/findings.     Assessment/Plan:     Active Diagnoses:    Diagnosis Date Noted POA    PRINCIPAL PROBLEM:  Closed fracture of left femur [S72.92XA] 11/17/2024 Yes    Fall in shower [W18.2XXA] 11/19/2024 Yes    Stage 3b chronic kidney disease [N18.32] 11/19/2024 Yes      Problems Resolved During this Admission:   78 Yo F  11/18/24 IMN L femur    Diet: As tolerated  Pain: multimodal PRN  DVT: Heparin;  OK for ASA 81mg BID on DC x30D. If on heparin outpatient, may continue this for DVT ppx.   ABLA; expected for fracture . Stable  PT/OT: Eval and Treat  Activity: WBAT LLE  May leave open to air if no drainage.   CM for DC needs; rehab placement planned.   Follow up with Wai Mccormack PA-C/ Dr. Oc Story in clinic in 3 weeks for wound check and repeat evaluation.   Ortho stable for DC to rehab when ready. Will continue to follow peripherally during her stay. Please call with questions or concerns.     The above findings, diagnostics, and treatment plan were discussed with Dr Story who is in agreement with the plan of care except as stated in additional documentation.       Maria R Abbasi, FNP   Orthopedic Trauma Surgery  [facility]

## 2024-11-21 NOTE — PLAN OF CARE
Problem: Adult Inpatient Plan of Care  Goal: Plan of Care Review  11/21/2024 1010 by Dayana Grullon RN  Outcome: Met  11/21/2024 1009 by Dayana Grullon RN  Outcome: Progressing  Goal: Patient-Specific Goal (Individualized)  11/21/2024 1010 by Dayana Grullon RN  Outcome: Met  11/21/2024 1009 by Dayana Grullon RN  Outcome: Progressing  Goal: Absence of Hospital-Acquired Illness or Injury  11/21/2024 1010 by Dayana Grullon RN  Outcome: Met  11/21/2024 1009 by Dayana Grullon RN  Outcome: Progressing  Goal: Optimal Comfort and Wellbeing  11/21/2024 1010 by Dayana Grullon RN  Outcome: Met  11/21/2024 1009 by Dayana Grullon RN  Outcome: Progressing  Goal: Readiness for Transition of Care  11/21/2024 1010 by Dayana Grullon RN  Outcome: Met  11/21/2024 1009 by Dayana Grullon RN  Outcome: Progressing

## 2024-11-21 NOTE — PLAN OF CARE
Alva at [facility] updates me that they have everything they need and Roc handled everything. She has no need for . Spoke with Roc SSC he also has no need for assist, transport should arrive in an hour.

## 2024-11-21 NOTE — DISCHARGE SUMMARY
[facility]  General Surgery  Discharge Summary      Patient Name: Elvira Rivers  MRN: [id number]  Admission Date: 11/17/2024  Hospital Length of Stay: 4 days  Discharge Date and Time:  11/21/2024 7:24 AM  Attending Physician: Guilherme Umanzor MD   Discharging Provider: Thor Iglesias MD  Primary Care Provider: Aruna Goncalves [facility]     HPI: Patient is a 79 year old female who presents for LLE pain following mechanical GLF at home. Imaging concerning for midshaft fracture of left femur. Trauma surgery consulted for admission for future orthopedic intervention.  Patient reports she was taking a shower when she fell after her shower handle broke. Patient denies any head trauma and denies any LOC. She additionally denies taking any blood thinners at home. Her only complaint is LLE pain, no other injuries reported. She denies any fevers, chills, abdominal pain, and n/v/d.    Procedure(s) (LRB):  INSERTION, INTRAMEDULLARY HANK, FEMUR (Left)     Hospital Course: Patient presented 11/17 for left leg pain following fall at home. Workup concerning for left femur fracture. Orthopedic surgery consulted, underwent IMN 11/18. Patient working with PT/OT. Voiding spontaneously. Tolerating a diet without difficulty. Pain is well controlled. Patient is medically stable for discharge to rehab facility.   Weight bearing as tolerated to left leg. Aspirin 81 mg BID for 30 days for VTE prophylaxis. Orthopedic surgery follow up in 3 weeks.     Consults:   Consults (From admission, onward)          Status Ordering Provider     Inpatient consult to Social Work/Case Management  Once        Provider:  (Not yet assigned)    Acknowledged RODOLFO ALLISON     Inpatient consult to Orthopedic Surgery  Once        Provider:  Gary Singh MD    Completed THOR IGLESIAS            Significant Diagnostic Studies:   Recent Labs     11/19/24  0420 11/21/24  0440   WBC 14.07* 8.81   HGB 9.2* 8.9*   HCT 27.6* 26.2*  "   234     Recent Labs     11/18/24  1749 11/19/24  0420 11/20/24  0550 11/21/24  0440   * 131* 133* 130*   K 4.3 4.2 4.8 4.3    101 99 98   CO2 20* 22* 26 25   BUN 15.4 17.1 21.4* 24.8*   CREATININE 1.24* 1.02 0.84 0.76   CALCIUM 8.5 8.0* 8.4 8.2*   MG  --  2.00  --  1.80   PHOS  --  3.3  --  3.2   ALBUMIN  --   --   --  2.7*   BILITOT  --   --   --  0.3   AST  --   --   --  37*   ALKPHOS  --   --   --  69   ALT  --   --   --  17     No results for input(s): "POCTGLUCOSE" in the last 72 hours.     Imaging Results              X-Ray Femur 2 View Left (Final result)  Result time 11/17/24 22:45:59      Final result by Charlie Rubalcava MD (11/17/24 22:45:59)                   Impression:      The angulation of the femur fracture is mildly improved.  Bowel      Electronically signed by: Charlie Rubalcava MD  Date:    11/17/2024  Time:    22:45               Narrative:    EXAMINATION:  XR FEMUR 2 VIEW LEFT    CLINICAL HISTORY:  reduction of femur;    TECHNIQUE:  AP and lateral views of the left femur were performed.    COMPARISON:  None}    FINDINGS:  There is midshaft femur fracture.  There is persistent angulation.                                       X-Ray Femur Ap/Lat Left (Final result)  Result time 11/17/24 20:10:06      Final result by Charlie Rubalcava MD (11/17/24 20:10:06)                   Impression:      Study showing midshaft femur fracture      Electronically signed by: Charlie Rubalcava MD  Date:    11/17/2024  Time:    20:10               Narrative:    EXAMINATION:  XR FEMUR 2 VIEW LEFT    CLINICAL HISTORY:  Pain, unspecified    TECHNIQUE:  AP and lateral views of the left femur were performed.    COMPARISON:  None}    FINDINGS:  There is a midshaft femur fracture.  Fracture is angulated but not significantly displaced.                                       X-Ray Chest AP Portable (Final result)  Result time 11/17/24 20:00:56      Final result by Charlie Rubalcava MD (11/17/24 20:00:56)        "            Impression:      No acute disease is seen      Electronically signed by: Charlie Rubalcava MD  Date:    11/17/2024  Time:    20:00               Narrative:    EXAMINATION:  XR CHEST AP PORTABLE    CLINICAL HISTORY:  Encounter for preprocedural respiratory examination    TECHNIQUE:  Single frontal view of the chest was performed.    COMPARISON:  08/07/2024    FINDINGS:  There are mild chronic interstitial changes.  Heart size is within normal limits.  Costophrenic angles are clear.  There is vascular calcification noted.                                       X-Ray Pelvis Routine AP (Final result)  Result time 11/17/24 20:03:11   Procedure changed from X-Ray Hip 2 or 3 views Left with Pelvis when performed     Final result by Charlie Rubalcava MD (11/17/24 20:03:11)                   Impression:      No acute abnormalities are seen      Electronically signed by: Charlie Rubalcava MD  Date:    11/17/2024  Time:    20:03               Narrative:    EXAMINATION:  XR PELVIS ROUTINE AP    CLINICAL HISTORY:  pain;    TECHNIQUE:  AP view of the pelvis was performed.    COMPARISON:  None.    FINDINGS:  There are no acute fractures seen.  There is no dislocation.  There are no bony lesions noted.                                        Pending Diagnostic Studies:       Procedure Component Value Units Date/Time    Prealbumin [[id number]] Collected: 11/21/24 0440    Order Status: Sent Lab Status: In process Updated: 11/21/24 0457    Specimen: Blood           Final Active Diagnoses:    Diagnosis Date Noted POA    PRINCIPAL PROBLEM:  Closed fracture of left femur [S72.92XA] 11/17/2024 Yes    Fall in shower [W18.2XXA] 11/19/2024 Yes    Stage 3b chronic kidney disease [N18.32] 11/19/2024 Yes      Problems Resolved During this Admission:      Discharged Condition: good    Disposition: Rehab Facility    Follow Up:   Follow-up Information       Oc Story DO Follow up.    Specialty: Orthopedic Surgery  Why: Follow up with  Haily Mccormack PA-C/ Dr. Oc Story in clinic in 3 weeks for wound check and repeat evaluation  Contact information:  [address] [facility]  [address]  [place] [address] [phone]               [facility] Follow up.    Specialty: Trauma Surgery  Why: As needed  Contact information:  [address] [place] [address] [address] [place] [address] [phone]                         Patient Instructions:      Diet Adult Regular     Notify your health care provider if you experience any of the following:  temperature >100.4     Notify your health care provider if you experience any of the following:  severe uncontrolled pain     Notify your health care provider if you experience any of the following:  redness, tenderness, or signs of infection (pain, swelling, redness, odor or green/yellow discharge around incision site)     No dressing needed   Order Comments: Ok to leave surgical incision open to air if no discharge.  81mg aspirin x30 days twice a day for DVT prophylaxis     Weight bearing restrictions (specify):   Order Comments: Weight bearing as tolerated to left leg     Medications:  Reconciled Home Medications:      Medication List        START taking these medications      acetaminophen 325 MG tablet  Commonly known as: TYLENOL  Take 2 tablets (650 mg total) by mouth every 6 (six) hours. for 10 days     aspirin 81 MG EC tablet  Commonly known as: ECOTRIN  Take 1 tablet (81 mg total) by mouth 2 (two) times a day.     ondansetron 8 MG Tbdl  Commonly known as: ZOFRAN-ODT  Take 1 tablet (8 mg total) by mouth every 8 (eight) hours as needed.     oxyCODONE 5 MG immediate release tablet  Commonly known as: ROXICODONE  Take 1 tablet (5 mg total) by mouth every 4 (four) hours as needed for Pain.     polyethylene glycol 17 gram Pwpk  Commonly known as: GLYCOLAX  Take 17 g by mouth 2 (two) times daily. for 10 days     senna 8.6 mg tablet  Commonly known as: SENOKOT  Take 1 tablet by mouth 2 (two)  times a day. for 10 days            CONTINUE taking these medications      hydroCHLOROthiazide 12.5 MG Tab  Commonly known as: HYDRODIURIL  Take 12.5 mg by mouth.     levothyroxine 112 MCG tablet  Commonly known as: SYNTHROID  Take 112 mcg by mouth.     rOPINIRole 3 MG tablet  Commonly known as: REQUIP  Take 1 tablet by mouth every evening.     simvastatin 20 MG tablet  Commonly known as: ZOCOR  Take 20 mg by mouth.              Thor Iglesias MD  General Surgery  [facility]

## 2024-11-22 ENCOUNTER — HOSPITAL ENCOUNTER (OUTPATIENT)
Dept: RADIOLOGY | Facility: HOSPITAL | Age: 79
Discharge: HOME | End: 2024-11-22
Attending: PHYSICAL MEDICINE & REHABILITATION
Payer: MEDICARE

## 2024-11-22 DIAGNOSIS — S72.302A: ICD-10-CM

## 2024-11-22 PROCEDURE — 73502 X-RAY EXAM HIP UNI 2-3 VIEWS: CPT | Mod: TC,LT

## 2024-11-23 NOTE — PHYSICIAN QUERY
Please specify the diagnosis associated with the clinical findings.     Unspecified acute kidney failure/injury

## 2024-12-09 ENCOUNTER — HOSPITAL ENCOUNTER (OUTPATIENT)
Dept: RADIOLOGY | Facility: CLINIC | Age: 79
Discharge: HOME | End: 2024-12-09
Attending: PHYSICIAN ASSISTANT
Payer: MEDICARE

## 2024-12-09 ENCOUNTER — OFFICE VISIT (OUTPATIENT)
Dept: ORTHOPEDICS | Facility: CLINIC | Age: 79
End: 2024-12-09
Payer: MEDICARE

## 2024-12-09 VITALS
SYSTOLIC BLOOD PRESSURE: 111 MMHG | WEIGHT: 129 LBS | DIASTOLIC BLOOD PRESSURE: 63 MMHG | HEART RATE: 94 BPM | HEIGHT: 63 IN | BODY MASS INDEX: 22.86 KG/M2

## 2024-12-09 DIAGNOSIS — S72.92XD CLOSED FRACTURE OF LEFT FEMUR WITH ROUTINE HEALING, UNSPECIFIED FRACTURE MORPHOLOGY, UNSPECIFIED PORTION OF FEMUR, SUBSEQUENT ENCOUNTER: ICD-10-CM

## 2024-12-09 DIAGNOSIS — S72.92XD CLOSED FRACTURE OF LEFT FEMUR WITH ROUTINE HEALING, UNSPECIFIED FRACTURE MORPHOLOGY, UNSPECIFIED PORTION OF FEMUR, SUBSEQUENT ENCOUNTER: Primary | ICD-10-CM

## 2024-12-09 PROCEDURE — 73552 X-RAY EXAM OF FEMUR 2/>: CPT | Mod: LT,,, | Performed by: PHYSICIAN ASSISTANT

## 2024-12-09 PROCEDURE — 99024 POSTOP FOLLOW-UP VISIT: CPT | Mod: POP,,, | Performed by: PHYSICIAN ASSISTANT

## 2024-12-09 NOTE — PROGRESS NOTES
"  Subjective:       Patient ID: Elvira Rivers is a 79 y.o. female.  Chief Complaint   Patient presents with    Left Hip - Post-op Evaluation     3 wks, IMN LEFT FEMUR FX sx 11/18/24 gl 2/16/25, wbat, ambulates with walker, states after sitting for long period of times will have some tightness, has been walking at home, has been working with  pt as well, no other complaints,         HPI  History of Present Illness    HPI:  Ms. Rivers presents for three-week follow-up intramedullary nail left femur shaft fracture. She experiences pain in the hip and groin area, especially when attempting to stand after prolonged sitting. She has been receiving home health care and physical therapy, with home health only visiting once in the first week before determining she did not need their services. She continues with home physical therapy.    Regarding pain management, she reports taking pain medication. Her daughter clarifies that she has been taking at least two pain medications per day. She acknowledges having a lower pain tolerance since the surgery.    She has been showering with assistance from her sister, who is a caregiver. She still has staples in place from the surgery, and the incisions are reported to be "looking okay." Her fracture is showing early signs of healing, which the practitioner notes is faster than usual.  She is using a walker for ambulation today.           ROS:  Constitutional: Denies fever chills  Eyes: No change in vision  ENT: No ringing or current infections  CV: No chest pain  Resp: No labored breathing  MSK: Pain evident at site of injury located in HPI,   Integ: No signs of abrasions or lacerations  Neuro: No numbness or tingling  Lymphatic: No swelling outside the area of injury     Current Outpatient Medications on File Prior to Visit   Medication Sig Dispense Refill    aspirin (ECOTRIN) 81 MG EC tablet Take 1 tablet (81 mg total) by mouth 2 (two) times a day.      " "hydroCHLOROthiazide (HYDRODIURIL) 12.5 MG Tab Take 12.5 mg by mouth.      levothyroxine (SYNTHROID) 112 MCG tablet Take 112 mcg by mouth.      rOPINIRole (REQUIP) 3 MG tablet Take 1 tablet by mouth every evening.      simvastatin (ZOCOR) 20 MG tablet Take 20 mg by mouth.       No current facility-administered medications on file prior to visit.          Objective:      /63   Pulse 94   Ht 5' 3" (1.6 m)   Wt 58.5 kg (128 lb 15.5 oz)   BMI 22.85 kg/m²   Physical Exam  General the patient is alert and oriented x3 no acute distress nontoxic-appearing appropriate affect.    Constitutional: Vital signs are examined and stable.  Resp: No signs of labored breathing               LLE: -Skin:  Incisions healing well without erythema, drainage, signs of dehiscence, staples removed today without complication.  Tolerates full passive range of motion of the hip           -MSK: Hip and Knee F/E, EHL/FHL, Gastroc/Tib anterior Strength 5/5           -Neuro:  Sensation intact to light touch L3-S1 dermatomes           -Lymphatic: No signs of lymphadenopathy           -CV: Capillary refill is less than 2 seconds.        Body mass index is 22.85 kg/m².  Ideal body weight: 52.4 kg (115 lb 8.3 oz)  Adjusted ideal body weight: 54.8 kg (120 lb 14.4 oz)  No results found for: "HGBA1C"  Hgb   Date Value Ref Range Status   11/26/2024 9.4 (L) 11.8 - 16.0 g/dL Final   11/22/2024 9.0 (L) 11.8 - 16.0 g/dL Final     Hct   Date Value Ref Range Status   11/26/2024 28.4 (L) 36.0 - 48.0 % Final   11/22/2024 27.4 (L) 36.0 - 48.0 % Final     No results found for: "IRON"  No components found for: "FROLATE"  No results found for: "[id number]"  WBC   Date Value Ref Range Status   11/26/2024 6.04 4.00 - 11.50 x10(3)/mcL Final   11/22/2024 7.29 4.00 - 11.50 x10(3)/mcL Final       Radiology:  Three-view x-ray left femur reveal hardware intact without signs of loosening or failure.  Helical blade is close to the cortical line of the femoral head.  " In the other views appears below the cortical line.  Otherwise hardware remains intact without loosening or failure.  Some early signs of fracture healing.        Assessment:         1. Closed fracture of left femur with routine healing, unspecified fracture morphology, unspecified portion of femur, subsequent encounter  X-Ray Femur 2 View Left              Plan:         Follow up in about 4 weeks (around 1/6/2025).    Elvira was seen today for post-op evaluation.    Diagnoses and all orders for this visit:    Closed fracture of left femur with routine healing, unspecified fracture morphology, unspecified portion of femur, subsequent encounter  -     X-Ray Femur 2 View Left; Future        -Assessment & Plan    PLAN SUMMARY:  - Staples removed from surgical incision  - Follow-up in 4 to 6 weeks for incision check    PROCEDURES:  - Removed staples from the patient's surgical incision.  Healing well without erythema, drainage, signs of dehiscence    FOLLOW UP:  - Follow up in 4 weeks for repeat x-rays and evaluation.  Continue to monitor hip pain and hardware proximally for erosion through the femoral head.  - we will continue with home physical therapy at this time.  She is not ready to transition to outpatient therapy.  Her family is present with her today.  She is taking minimal pain medications.  We will send refills to her as needed.  Call for follow up sooner if she has any concerns.       -ED precautions given    The above findings, diagnostics, and treatment plan were discussed with Dr. Story who is in agreement with the plan of care except as stated in additional documentation.       Haily Mccormack PA-C          Future Appointments   Date Time Provider Department Center   1/2/2025 10:15 AM [facility] [place]   1/8/2025  9:30 AM Oc Story DO [facility] [place]

## 2024-12-31 DIAGNOSIS — S72.92XD CLOSED FRACTURE OF LEFT FEMUR WITH ROUTINE HEALING, UNSPECIFIED FRACTURE MORPHOLOGY, UNSPECIFIED PORTION OF FEMUR, SUBSEQUENT ENCOUNTER: Primary | ICD-10-CM

## 2024-12-31 RX ORDER — OXYCODONE AND ACETAMINOPHEN 5; 325 MG/1; MG/1
1 TABLET ORAL EVERY 8 HOURS PRN
Qty: 21 EACH | Refills: 0 | Status: SHIPPED | OUTPATIENT
Start: 2024-12-31 | End: 2025-01-07

## 2025-01-02 ENCOUNTER — HOSPITAL ENCOUNTER (OUTPATIENT)
Dept: RADIOLOGY | Facility: HOSPITAL | Age: 80
Discharge: HOME | End: 2025-01-02
Attending: OBSTETRICS & GYNECOLOGY
Payer: MEDICARE

## 2025-01-02 DIAGNOSIS — Z12.31 ENCOUNTER FOR SCREENING MAMMOGRAM FOR MALIGNANT NEOPLASM OF BREAST: ICD-10-CM

## 2025-01-02 PROCEDURE — 77067 SCR MAMMO BI INCL CAD: CPT | Mod: TC

## 2025-01-06 ENCOUNTER — OFFICE VISIT (OUTPATIENT)
Dept: ORTHOPEDICS | Facility: CLINIC | Age: 80
End: 2025-01-06
Payer: MEDICARE

## 2025-01-06 ENCOUNTER — HOSPITAL ENCOUNTER (OUTPATIENT)
Dept: RADIOLOGY | Facility: CLINIC | Age: 80
Discharge: HOME | End: 2025-01-06
Attending: ORTHOPAEDIC SURGERY
Payer: MEDICARE

## 2025-01-06 VITALS
WEIGHT: 127.88 LBS | SYSTOLIC BLOOD PRESSURE: 140 MMHG | DIASTOLIC BLOOD PRESSURE: 52 MMHG | HEART RATE: 78 BPM | BODY MASS INDEX: 22.66 KG/M2 | HEIGHT: 63 IN

## 2025-01-06 DIAGNOSIS — S72.92XD CLOSED FRACTURE OF LEFT FEMUR WITH ROUTINE HEALING, UNSPECIFIED FRACTURE MORPHOLOGY, UNSPECIFIED PORTION OF FEMUR, SUBSEQUENT ENCOUNTER: ICD-10-CM

## 2025-01-06 DIAGNOSIS — S72.92XD CLOSED FRACTURE OF LEFT FEMUR WITH ROUTINE HEALING, UNSPECIFIED FRACTURE MORPHOLOGY, UNSPECIFIED PORTION OF FEMUR, SUBSEQUENT ENCOUNTER: Primary | ICD-10-CM

## 2025-01-06 PROCEDURE — 99024 POSTOP FOLLOW-UP VISIT: CPT | Mod: POP,,, | Performed by: ORTHOPAEDIC SURGERY

## 2025-01-06 PROCEDURE — 73552 X-RAY EXAM OF FEMUR 2/>: CPT | Mod: LT,,, | Performed by: ORTHOPAEDIC SURGERY

## 2025-01-06 NOTE — PROGRESS NOTES
"Subjective:       Patient ID: Elvira Rivers is a 79 y.o. female.  Chief Complaint   Patient presents with    Left Femur - Follow-up     7 WEEK F/U FROM N LEFT FEMUR FX. REPORTS ACHING PAIN IN GROIN WORSE WITH THERAPY. REPORTS MINIMAL RELIEF WITH MEDICATION.        HPI:  History of Present Illness    HPI:  Ms. Rivers presents for follow-up of a segmental femur fracture. She reports pain and catching in her hip, which prompted concern from her healthcare team. However, she states that the pain has subsided since stopping physical therapy due to discomfort. Ms. Rivers mentions walking with a walker caused pain, and she reports pain in the groin area with certain exercises. She notes difficulty turning over in bed, particularly when attempting to turn to her right side, requiring caution with her leg. Ms. Rivers denies current symptoms of catching in the hip. Ms. Rivers's daughter mentions that the home physical therapy was aggressive, leading to increased pain. Ms. Rivers also reports increased sensitivity to weather changes.    IMAGING:  Ms. Rivers's femur X-rays reveal a segmental femur fracture that has not worsened. A fracture line with a growing cloud formation indicates healing. The helical blade has moved significantly since the initial surgery, a condition referred to as "cut out". The helical blade is protruding by about 1-2 mm. Previous X-rays were mentioned and compared to the current ones to assess the migration of the implant.      ROS:  Musculoskeletal: +joint pain          ROS:  Constitutional: Denies fever chills  Eyes: No change in vision  ENT: No ringing or current infections  CV: No chest pain  Resp: No labored breathing  MSK: Pain evident at site of injury located in HPI,   Integ: No signs of abrasions or lacerations  Neuro: No numbness or tingling  Lymphatic: No swelling outside the area of injury     Current Outpatient Medications on File Prior to Visit " "  Medication Sig Dispense Refill    hydroCHLOROthiazide (HYDRODIURIL) 12.5 MG Tab Take 12.5 mg by mouth.      levothyroxine (SYNTHROID) 112 MCG tablet Take 112 mcg by mouth.      oxyCODONE-acetaminophen (PERCOCET) 5-325 mg per tablet Take 1 tablet by mouth every 8 (eight) hours as needed for Pain. 21 each 0    rOPINIRole (REQUIP) 3 MG tablet Take 1 tablet by mouth every evening.      simvastatin (ZOCOR) 20 MG tablet Take 20 mg by mouth.      aspirin (ECOTRIN) 81 MG EC tablet Take 1 tablet (81 mg total) by mouth 2 (two) times a day.       No current facility-administered medications on file prior to visit.          Objective:      BP (!) 140/52   Pulse 78   Ht 5' 3" (1.6 m)   Wt 58 kg (127 lb 13.9 oz)   BMI 22.65 kg/m²   General the patient is alert and oriented x3 no acute distress nontoxic-appearing appropriate affect.    Constitutional: Vital signs are examined and stable.  Resp: No signs of labored breathing      LLE: -Skin:  No pain with range of motion of the hip no catching no pop           -MSK: Hip and Knee F/E, EHL/FHL, Gastroc/Tib anterior Strength 5/5           -Neuro:  Sensation intact to light touch L3-S1 dermatomes           -Lymphatic: No signs of lymphadenopathy           -CV: Capillary refill is less than 2 seconds. DP/PT pulses 2/4. Compartments soft and compressible    Body mass index is 22.65 kg/m².  Ideal body weight: 52.4 kg (115 lb 8.3 oz)  Adjusted ideal body weight: 54.6 kg (120 lb 7.3 oz)  No results found for: "HGBA1C"  Hgb   Date Value Ref Range Status   11/26/2024 9.4 (L) 11.8 - 16.0 g/dL Final   11/22/2024 9.0 (L) 11.8 - 16.0 g/dL Final     No results found for: "[id number]"  WBC   Date Value Ref Range Status   11/26/2024 6.04 4.00 - 11.50 x10(3)/mcL Final   11/22/2024 7.29 4.00 - 11.50 x10(3)/mcL Final       Radiology:  Two views left femur skeletally mature individual shows early callus formation of the femoral shaft.  Collapse with partial implant exposure to the proximal " femoral head screw cut out        Assessment:         1. Closed fracture of left femur with routine healing, unspecified fracture morphology, unspecified portion of femur, subsequent encounter  X-Ray Femur 2 View Left              Plan:         No follow-ups on file.    Elvira was seen today for follow-up.    Diagnoses and all orders for this visit:    Closed fracture of left femur with routine healing, unspecified fracture morphology, unspecified portion of femur, subsequent encounter  -     X-Ray Femur 2 View Left; Future      Assessment & Plan    PLAN SUMMARY:  - Consider exchanging helical blade for a shorter one if symptoms persist but fracture heals  - Follow up in 4-5 weeks with x-rays to reassess hip and femur fracture healing  - Continue walking with a walker, avoiding stairs and aggressive activities  - Lead with right leg when going up steps, left leg when going down  - Restrict to non-weight bearing and limited range of motion exercises  - Return sooner if significant symptoms develop  - Consider total hip replacement if helical blade collapses into the joint    FOLLOW UP:  - Follow up in 4-5 weeks.  - Assess symptoms at next appointment.  - Return sooner if significant symptoms develop.    RETURN TO ACTIVITY:  - Restrict to non-weight bearing and limited range of motion exercises.  - Continue walking with a walker, but avoid aggressive activities.  - Avoid stairs and steps when possible to reduce pressure on the hip.  - Lead with the right leg when going up steps, and lead with the left leg when going down.    IMAGING ORDERS:  - Ordered follow-up x-rays in 4-5 weeks to reassess hip and femur fracture healing.    PROCEDURES:  - Discussed potential future procedures based on the patient's progress:  - Consider exchanging the helical blade for a shorter one if symptoms persist but fracture heals.  - Consider total hip replacement if the helical blade collapses into the joint.  - Explained that decision on  future procedures will depend on symptoms and x-ray results at the next follow-up.          Patient is doing well.  She largely has minimal symptoms today.  We will continue to evaluate this.  We had a long discussion about surgical options.  She does have a segmental femur fracture with an intertrochanteric component and midshaft femur component.  I suspect the midshaft femur we will heal without complication.  She will be candidate for arthroplasty versus helical blade exchange of the left hip.  We will also have close follow up to observe for further collapse.      This note/OR report was created with the assistance of  voice recognition software or phone  dictation.  There may be transcription errors as a result of using this technology however minimal. Effort has been made to assure accuracy of transcription but any obvious errors or omissions should be clarified with the author of the document.     This note was generated with the assistance of ambient listening technology. Verbal consent was obtained by the patient and accompanying visitor(s) for the recording of patient appointment to facilitate this note. I attest to having reviewed and edited the generated note for accuracy, though some syntax or spelling errors may persist. Please contact the author of this note for any clarification.       Oc Story DO  Orthopedic Trauma Surgery  01/06/2025      Future Appointments   Date Time Provider Department Center   1/6/2025 12:45 PM Oc Story DO [facility] [place]   2/5/2025 10:45 AM Oc Story DO [facility] [place]

## 2025-02-05 ENCOUNTER — PATIENT MESSAGE (OUTPATIENT)
Dept: ORTHOPEDICS | Facility: CLINIC | Age: 80
End: 2025-02-05

## 2025-02-05 ENCOUNTER — OFFICE VISIT (OUTPATIENT)
Dept: ORTHOPEDICS | Facility: CLINIC | Age: 80
End: 2025-02-05
Payer: MEDICARE

## 2025-02-05 ENCOUNTER — HOSPITAL ENCOUNTER (OUTPATIENT)
Dept: RADIOLOGY | Facility: CLINIC | Age: 80
Discharge: HOME | End: 2025-02-05
Attending: ORTHOPAEDIC SURGERY
Payer: MEDICARE

## 2025-02-05 VITALS — BODY MASS INDEX: 22.66 KG/M2 | WEIGHT: 127.88 LBS | HEIGHT: 63 IN

## 2025-02-05 DIAGNOSIS — S72.92XD CLOSED FRACTURE OF LEFT FEMUR WITH ROUTINE HEALING, UNSPECIFIED FRACTURE MORPHOLOGY, UNSPECIFIED PORTION OF FEMUR, SUBSEQUENT ENCOUNTER: Primary | ICD-10-CM

## 2025-02-05 DIAGNOSIS — S72.92XD CLOSED FRACTURE OF LEFT FEMUR WITH ROUTINE HEALING, UNSPECIFIED FRACTURE MORPHOLOGY, UNSPECIFIED PORTION OF FEMUR, SUBSEQUENT ENCOUNTER: ICD-10-CM

## 2025-02-05 PROCEDURE — 73552 X-RAY EXAM OF FEMUR 2/>: CPT | Mod: LT,,, | Performed by: ORTHOPAEDIC SURGERY

## 2025-02-05 PROCEDURE — 99024 POSTOP FOLLOW-UP VISIT: CPT | Mod: POP,,, | Performed by: PHYSICIAN ASSISTANT

## 2025-02-05 RX ORDER — ALENDRONATE SODIUM 70 MG/1
70 TABLET ORAL
COMMUNITY

## 2025-02-05 NOTE — PROGRESS NOTES
"Subjective:       Patient ID: Elvira Rivers is a 79 y.o. female.  Chief Complaint   Patient presents with    Left Femur - Follow-up     11 wks, IMN lt femur fx, sx-11/18/24-2/15/25, wbat, ambulates with walker, reports minimal to moderate pain,        HPI:  Patient 11 weeks post op IMN left femur. WBAT and ROMAT. Dcd from home PT. States some mild pain in the groin positional, worse at night when laying on side. Improved when lying on back. No pain otherwise. Ambulatory with walker. Would like to transition off walker. Would like to restart home PT vs outpatient therapy. No ROM limitations at the hip or knee. Doing well overall.     ROS:  Constitutional: Denies fever chills  Eyes: No change in vision  ENT: No ringing or current infections  CV: No chest pain  Resp: No labored breathing  MSK: Pain evident at site of injury located in HPI,   Integ: No signs of abrasions or lacerations  Neuro: No numbness or tingling  Lymphatic: No swelling outside the area of injury     Current Outpatient Medications on File Prior to Visit   Medication Sig Dispense Refill    aspirin (ECOTRIN) 81 MG EC tablet Take 1 tablet (81 mg total) by mouth 2 (two) times a day.      hydroCHLOROthiazide (HYDRODIURIL) 12.5 MG Tab Take 12.5 mg by mouth.      levothyroxine (SYNTHROID) 112 MCG tablet Take 112 mcg by mouth.      rOPINIRole (REQUIP) 3 MG tablet Take 1 tablet by mouth every evening.      simvastatin (ZOCOR) 20 MG tablet Take 20 mg by mouth.      alendronate (FOSAMAX) 70 MG tablet Take 70 mg by mouth every 7 days.       No current facility-administered medications on file prior to visit.          Objective:      Ht 5' 3" (1.6 m)   Wt 58 kg (127 lb 13.9 oz)   BMI 22.65 kg/m²   General the patient is alert and oriented x3 no acute distress nontoxic-appearing appropriate affect.    Constitutional: Vital signs are examined and stable.  Resp: No signs of labored breathing      LLE: -Skin:  No pain with range of motion of the hip         " "   -MSK: Hip and Knee F/E, EHL/FHL, Gastroc/Tib anterior Strength 5/5           -Neuro:  Sensation intact to light touch L3-S1 dermatomes           -Lymphatic: No signs of lymphadenopathy           -CV: Capillary refill is less than 2 seconds. DP/PT pulses 2/4. Compartments soft and compressible    Body mass index is 22.65 kg/m².  Ideal body weight: 52.4 kg (115 lb 8.3 oz)  Adjusted ideal body weight: 54.6 kg (120 lb 7.3 oz)  No results found for: "HGBA1C"  Hgb   Date Value Ref Range Status   11/26/2024 9.4 (L) 11.8 - 16.0 g/dL Final   11/22/2024 9.0 (L) 11.8 - 16.0 g/dL Final     No results found for: "[id number]"  WBC   Date Value Ref Range Status   11/26/2024 6.04 4.00 - 11.50 x10(3)/mcL Final   11/22/2024 7.29 4.00 - 11.50 x10(3)/mcL Final       Radiology:  Two views left femur skeletally mature individual shows early callus formation of the femoral shaft.  No change of the femoral head screw as compared to previous images. Appears it may be just at the articular surface.         Assessment:         1. Closed fracture of left femur with routine healing, unspecified fracture morphology, unspecified portion of femur, subsequent encounter  X-Ray Femur 2 View Left    SUBSEQUENT HOME HEALTH ORDERS              Plan:         No follow-ups on file.    Elvira was seen today for follow-up.    Diagnoses and all orders for this visit:    Closed fracture of left femur with routine healing, unspecified fracture morphology, unspecified portion of femur, subsequent encounter  -     X-Ray Femur 2 View Left; Future  -     SUBSEQUENT HOME HEALTH ORDERS      Assessment & Plan               No change to the implant today. Continue to monitor for intra-articular pain. She appears to have gone on to near full union of both her fracture. She is ambulatory with little to no pain. Will continue to monitor for symptoms. Discussed helical blade exchange possible in the future.  Dr. Story discussed this with them at the last visit as " well.   Restart home PT to help transition off walker to rollator or cane.  order for that today.   -Follow up in 6-8 weeks   - ED precautions provided.    The above findings, diagnostics, and treatment plan were discussed with Dr. Story who is in agreement with the plan of care except as stated in additional documentation.     Alexandra Blair Lemaire, PA-C [facility]   Orthopedic Trauma      Future Appointments   Date Time Provider Department Center   4/24/2025  8:00 AM Oc Story DO [facility] [place]

## 2025-04-12 ENCOUNTER — HOSPITAL ENCOUNTER (EMERGENCY)
Facility: HOSPITAL | Age: 80
Discharge: HOME | End: 2025-04-12
Attending: EMERGENCY MEDICINE
Payer: MEDICARE

## 2025-04-12 VITALS
HEIGHT: 63 IN | OXYGEN SATURATION: 100 % | BODY MASS INDEX: 23.04 KG/M2 | WEIGHT: 130 LBS | DIASTOLIC BLOOD PRESSURE: 62 MMHG | HEART RATE: 57 BPM | SYSTOLIC BLOOD PRESSURE: 154 MMHG | RESPIRATION RATE: 16 BRPM | TEMPERATURE: 97 F

## 2025-04-12 DIAGNOSIS — R07.9 CHEST PAIN: ICD-10-CM

## 2025-04-12 DIAGNOSIS — R05.1 ACUTE COUGH: Primary | ICD-10-CM

## 2025-04-12 LAB
ALBUMIN SERPL-MCNC: 3.6 G/DL (ref 3.4–4.8)
ALBUMIN/GLOB SERPL: 1 RATIO (ref 1.1–2)
ALP SERPL-CCNC: 72 UNIT/L (ref 40–150)
ALT SERPL-CCNC: 18 UNIT/L (ref 0–55)
ANION GAP SERPL CALC-SCNC: 10 MEQ/L
AST SERPL-CCNC: 20 UNIT/L (ref 11–45)
BASOPHILS # BLD AUTO: 0.09 X10(3)/MCL
BASOPHILS NFR BLD AUTO: 1.2 %
BILIRUB SERPL-MCNC: 0.3 MG/DL
BNP BLD-MCNC: <10 PG/ML
BUN SERPL-MCNC: 38 MG/DL (ref 9.8–20.1)
CALCIUM SERPL-MCNC: 8.8 MG/DL (ref 8.4–10.2)
CHLORIDE SERPL-SCNC: 99 MMOL/L (ref 98–107)
CO2 SERPL-SCNC: 23 MMOL/L (ref 23–31)
CREAT SERPL-MCNC: 1.23 MG/DL (ref 0.55–1.02)
CREAT/UREA NIT SERPL: 31
D DIMER PPP IA.FEU-MCNC: 0.41 UG/ML FEU (ref 0–0.5)
EOSINOPHIL # BLD AUTO: 0.32 X10(3)/MCL (ref 0–0.9)
EOSINOPHIL NFR BLD AUTO: 4.4 %
ERYTHROCYTE [DISTWIDTH] IN BLOOD BY AUTOMATED COUNT: 13.8 % (ref 11.5–17)
GFR SERPLBLD CREATININE-BSD FMLA CKD-EPI: 45 ML/MIN/1.73/M2
GLOBULIN SER-MCNC: 3.6 GM/DL (ref 2.4–3.5)
GLUCOSE SERPL-MCNC: 132 MG/DL (ref 82–115)
HCT VFR BLD AUTO: 36.1 % (ref 37–47)
HGB BLD-MCNC: 11.9 G/DL (ref 12–16)
IMM GRANULOCYTES # BLD AUTO: 0.02 X10(3)/MCL (ref 0–0.04)
IMM GRANULOCYTES NFR BLD AUTO: 0.3 %
LYMPHOCYTES # BLD AUTO: 1.93 X10(3)/MCL (ref 0.6–4.6)
LYMPHOCYTES NFR BLD AUTO: 26.4 %
MCH RBC QN AUTO: 28.1 PG (ref 27–31)
MCHC RBC AUTO-ENTMCNC: 33 G/DL (ref 33–36)
MCV RBC AUTO: 85.1 FL (ref 80–94)
MONOCYTES # BLD AUTO: 0.78 X10(3)/MCL (ref 0.1–1.3)
MONOCYTES NFR BLD AUTO: 10.7 %
NEUTROPHILS # BLD AUTO: 4.16 X10(3)/MCL (ref 2.1–9.2)
NEUTROPHILS NFR BLD AUTO: 57 %
NRBC BLD AUTO-RTO: 0 %
PLATELET # BLD AUTO: 247 X10(3)/MCL (ref 130–400)
PMV BLD AUTO: 9.8 FL (ref 7.4–10.4)
POTASSIUM SERPL-SCNC: 4.3 MMOL/L (ref 3.5–5.1)
PROT SERPL-MCNC: 7.2 GM/DL (ref 5.8–7.6)
RBC # BLD AUTO: 4.24 X10(6)/MCL (ref 4.2–5.4)
SODIUM SERPL-SCNC: 132 MMOL/L (ref 136–145)
TROPONIN I SERPL-MCNC: <0.01 NG/ML (ref 0–0.04)
WBC # BLD AUTO: 7.3 X10(3)/MCL (ref 4.5–11.5)

## 2025-04-12 PROCEDURE — 84484 ASSAY OF TROPONIN QUANT: CPT

## 2025-04-12 PROCEDURE — 80053 COMPREHEN METABOLIC PANEL: CPT

## 2025-04-12 PROCEDURE — 85379 FIBRIN DEGRADATION QUANT: CPT | Performed by: EMERGENCY MEDICINE

## 2025-04-12 PROCEDURE — 93010 ELECTROCARDIOGRAM REPORT: CPT | Mod: ,,, | Performed by: INTERNAL MEDICINE

## 2025-04-12 PROCEDURE — 99285 EMERGENCY DEPT VISIT HI MDM: CPT | Mod: 25

## 2025-04-12 PROCEDURE — 83880 ASSAY OF NATRIURETIC PEPTIDE: CPT

## 2025-04-12 PROCEDURE — 85025 COMPLETE CBC W/AUTO DIFF WBC: CPT

## 2025-04-12 PROCEDURE — 93005 ELECTROCARDIOGRAM TRACING: CPT

## 2025-04-12 NOTE — ED PROVIDER NOTES
Encounter Date: 4/12/2025       History     Chief Complaint   Patient presents with    Chest Pain     Pt states was diagnosed with covid on Tuesday morning, today woke up with right sided chest pain sharp this am and now feels dull.      See mdm    The history is provided by the patient.     Review of patient's allergies indicates:   Allergen Reactions    Lactase-rennet Other (See Comments)    Milk containing products (dairy)     Tree pollen-eastern cottonwood      Past Medical History:   Diagnosis Date    Asthma     Hypertension      Past Surgical History:   Procedure Laterality Date    EYE SURGERY  12/19/2022    FRACTURE SURGERY  07/17/21    INTRAMEDULLARY RODDING OF FEMUR Left 11/18/2024    Procedure: INSERTION, INTRAMEDULLARY HANK, FEMUR;  Surgeon: Oc Story DO;  Location: [facility] OR;  Service: Orthopedics;  Laterality: Left;  supine hana table c arm synthes long nail TFN    TONSILLECTOMY  1958    TUBAL LIGATION  1980     Family History   Problem Relation Name Age of Onset    Osteoarthritis Mother Norton B. Jose Alfredo'     Heart failure Mother Kelli B. Jose Alfredo'     Hypertension Mother Kelli B. Jose Alfredo'     Arthritis Mother Kelli B. Jose Alfredo'     Hearing loss Mother Norton B. Jose Alfredo'     Heart disease Mother Kelli B. Jose Alfredo'     Miscarriages / Stillbirths Mother Norton B. Jose Alfredo'     Vision loss Mother Kelli B. Jose Alfredo'         Macular Degeneration    Melanoma Mother Kelli B. Jose Alfredo' 70    Prostate cancer Father Donnelly Jose Alfredo' 70        metastatic    Heart failure Father Donnelly Jose Alfredo'     Arthritis Father Donnelly Jose Alfredo'     Cancer Father Donnelly Jose Alfredo'     Asthma Sister Elvira Cadenalechain     Thyroid disease Sister Elvira Duplechain     Thyroid disease Sister Matilde Pena     Breast cancer Sister Katy 69    Genetic Disorder Sister Katy         CHEK2 mutation    Throat cancer Brother Fausto Cadenare' 62        Squamous Cell Ca of tonsil    Hearing loss Brother Faustoki Cadenare'     Heart disease Brother Faustoki Cadenare'     Skin  cancer Brother Fausto Veliz'     Breast cancer Maternal Grandmother Veronica Aron     Diabetes Maternal Grandmother Veronica Aron     Heart failure Maternal Grandmother Veronica Aron     Heart disease Maternal Grandmother Veronica Aron     Leukemia Maternal Grandfather      Breast cancer Paternal Grandmother Selema Jose Alfredo'     Heart failure Paternal Grandmother Selema Jose Alfredo'     Cancer Paternal Grandmother Selema Jose Alfredo'     Heart disease Paternal Grandmother Selema Jose Alfredo'     Miscarriages / Stillbirths Paternal Grandmother Selema Jose Alfredo'     Heart failure Paternal Grandfather Mendoza Jose Alfredo'     Heart disease Paternal Grandfather Mendoza Jose Alfredo'     Hearing loss Maternal Uncle SUSIE Aron     Stroke Paternal Aunt Daisy Jose Alfredo'     Hearing loss Paternal Aunt Daisy Jose Alfredo'         Deaf mute    Hearing loss Paternal Uncle Jay Veliz'         Deaf mute    Hearing loss Paternal Uncle Darrell Cadenare'     Brain cancer Maternal Cousin  66    Asthma Sister Matilde Veliz'Becky     Miscarriages / Stillbirths Sister Matilde Veliz'Becky     Cancer Sister Katy Veliz'Monceaux     Hearing loss Sister Katy Veliz'Monceaux     Cancer Brother Fausto Veliz'     Hearing loss Brother Fausto Veliz'     Hearing loss Paternal Uncle Nba Sharp Jr.      Social History[1]  Review of Systems   Respiratory:  Positive for cough. Negative for shortness of breath and wheezing.    Cardiovascular:  Positive for chest pain.   All other systems reviewed and are negative.      Physical Exam     Initial Vitals [04/12/25 1519]   BP Pulse Resp Temp SpO2   (!) 177/98 69 18 97.3 °F (36.3 °C) 98 %      MAP       --         Physical Exam    Nursing note and vitals reviewed.  Constitutional: She appears well-developed.   HENT:   Head: Normocephalic.   Right Ear: External ear normal.   Left Ear: External ear normal.   Nose: Nose normal. Mouth/Throat: Oropharynx is clear and moist.   Eyes: EOM are normal.   Neck: Neck supple.   Cardiovascular:  Normal rate and  regular rhythm.           Pulmonary/Chest: No respiratory distress. She exhibits no tenderness.   Abdominal: Abdomen is soft. Bowel sounds are normal.   Musculoskeletal:         General: Normal range of motion.      Cervical back: Neck supple.     Neurological: She is alert and oriented to person, place, and time. GCS score is 15. GCS eye subscore is 4. GCS verbal subscore is 5. GCS motor subscore is 6.   Skin: Skin is warm. Capillary refill takes less than 2 seconds.         ED Course   Procedures  Labs Reviewed   COMPREHENSIVE METABOLIC PANEL - Abnormal       Result Value    Sodium 132 (*)     Potassium 4.3      Chloride 99      CO2 23      Glucose 132 (*)     Blood Urea Nitrogen 38.0 (*)     Creatinine 1.23 (*)     Calcium 8.8      Protein Total 7.2      Albumin 3.6      Globulin 3.6 (*)     Albumin/Globulin Ratio 1.0 (*)     Bilirubin Total 0.3      ALP 72      ALT 18      AST 20      eGFR 45      Anion Gap 10.0      BUN/Creatinine Ratio 31     CBC WITH DIFFERENTIAL - Abnormal    WBC 7.30      RBC 4.24      Hgb 11.9 (*)     Hct 36.1 (*)     MCV 85.1      MCH 28.1      MCHC 33.0      RDW 13.8      Platelet 247      MPV 9.8      Neut % 57.0      Lymph % 26.4      Mono % 10.7      Eos % 4.4      Basophil % 1.2      Imm Grans % 0.3      Neut # 4.16      Lymph # 1.93      Mono # 0.78      Eos # 0.32      Baso # 0.09      Imm Gran # 0.02      NRBC% 0.0     B-TYPE NATRIURETIC PEPTIDE - Normal    Natriuretic Peptide <10.0     TROPONIN I - Normal    Troponin-I <0.010     D DIMER, QUANTITATIVE - Normal    D-Dimer 0.41     CBC W/ AUTO DIFFERENTIAL    Narrative:     The following orders were created for panel order CBC auto differential.  Procedure                               Abnormality         Status                     ---------                               -----------         ------                     CBC with Differential[[id number]]       Abnormal            Final result                 Please view results for  these tests on the individual orders.     EKG Readings: (Independently Interpreted)   Initial Reading: No STEMI. Rhythm: Normal Sinus Rhythm. Heart Rate: 67. Ectopy: No Ectopy. Conduction: Normal. ST Segments: Normal ST Segments. T Waves: Normal. Axis: Normal. Clinical Impression: Normal Sinus Rhythm       Imaging Results              X-Ray Chest 1 View (Final result)  Result time 04/12/25 16:01:06      Final result by Gal Otto MD (04/12/25 16:01:06)                   Impression:      No acute cardiopulmonary process.      Electronically signed by: Gal Otto MD  Date:    04/12/2025  Time:    16:01               Narrative:    EXAMINATION:  Chest one view    CLINICAL HISTORY:  Right-sided chest pain    COMPARISON:  11/17/2024    FINDINGS:  Cardiac silhouette is normal in size.  The central vessels appear normal.  There is no confluent airspace disease.  There is no consolidation or visible pneumothorax or pleural effusion.                                       Medications - No data to display  Medical Decision Making  79 year old patient presents to the ED with complaints of right sided chest pain that started this morning. The patient states that she was diagnosed with Covid 19 at urgent care this past Tuesday and admits to a cough with clear sputum production. Patient states that cough is better but she started experiencing right chest pain and started to worry about pneumonia. The patient denies any dizziness, weakness, sob, or diaphoresis.     Amount and/or Complexity of Data Reviewed  Labs: ordered. Decision-making details documented in ED Course.  Radiology: ordered.      Additional MDM:   Differential Diagnosis:   Other: The following diagnoses were also considered and will be evaluated: ACS, Pneumonia and Pulmonary Embolism.            ED Course as of 04/12/25 1614   Sat Apr 12, 2025   1610 Patient labs stable. Patient chest X-ray negative for any acute abnormalities. Patient does admit that she  has cough medication at home. Patient educated to please continue medication at home. Please f/u with pcp in 3 days. Return to ED with any worsening of symptoms [DL]   1613 Troponin I: <0.010 [DL]   1613 BNP: <10.0 [DL]   1613 D-Dimer: 0.41 [DL]      ED Course User Index  [DL] Alan Mon NP                           Clinical Impression:  Final diagnoses:  [R07.9] Chest pain  [R05.1] Acute cough (Primary)          ED Disposition Condition    Discharge Stable          ED Prescriptions    None       Follow-up Information       Follow up With Specialties Details Why Contact Info    Mirian Brewer NP Family Medicine In 3 days  [address] [facility] [address]  [phone]                 [1]   Social History  Tobacco Use    Smoking status: Former     Current packs/day: 1.00     Average packs/day: 1 pack/day for 5.0 years (5.0 ttl pk-yrs)     Types: Cigarettes    Smokeless tobacco: Never    Tobacco comments:     Can't remember the dates.  But back in the 1960's   Substance Use Topics    Alcohol use: Not Currently     Comment: None in these times    Drug use: Never        Alan Mon NP  04/12/25 [id number]

## 2025-04-13 LAB
OHS QRS DURATION: 66 MS
OHS QTC CALCULATION: 416 MS

## 2025-04-28 ENCOUNTER — OFFICE VISIT (OUTPATIENT)
Dept: ORTHOPEDICS | Facility: CLINIC | Age: 80
End: 2025-04-28
Payer: MEDICARE

## 2025-04-28 ENCOUNTER — HOSPITAL ENCOUNTER (OUTPATIENT)
Dept: RADIOLOGY | Facility: CLINIC | Age: 80
Discharge: HOME | End: 2025-04-28
Attending: ORTHOPAEDIC SURGERY
Payer: MEDICARE

## 2025-04-28 VITALS
HEIGHT: 63 IN | DIASTOLIC BLOOD PRESSURE: 46 MMHG | HEART RATE: 56 BPM | SYSTOLIC BLOOD PRESSURE: 143 MMHG | BODY MASS INDEX: 23.03 KG/M2

## 2025-04-28 DIAGNOSIS — S72.92XD CLOSED FRACTURE OF LEFT FEMUR WITH ROUTINE HEALING, UNSPECIFIED FRACTURE MORPHOLOGY, UNSPECIFIED PORTION OF FEMUR, SUBSEQUENT ENCOUNTER: Primary | ICD-10-CM

## 2025-04-28 DIAGNOSIS — S72.92XD CLOSED FRACTURE OF LEFT FEMUR WITH ROUTINE HEALING, UNSPECIFIED FRACTURE MORPHOLOGY, UNSPECIFIED PORTION OF FEMUR, SUBSEQUENT ENCOUNTER: ICD-10-CM

## 2025-04-28 PROCEDURE — 99213 OFFICE O/P EST LOW 20 MIN: CPT | Mod: ,,, | Performed by: ORTHOPAEDIC SURGERY

## 2025-04-28 PROCEDURE — 73552 X-RAY EXAM OF FEMUR 2/>: CPT | Mod: LT,,, | Performed by: ORTHOPAEDIC SURGERY

## 2025-04-30 NOTE — PROGRESS NOTES
"Subjective:       Patient ID: Elvira Rivers is a 79 y.o. female.  Chief Complaint   Patient presents with    Left Femur - Follow-up     5.5 mos sp IMN Lt femur fx sx-11/18/24 - 2/15/25. Pt ambulating with a cane, only using when she leaves the house. Not having any pain. Not having to take anything for pain. Finished Physical therapy. Needing pelvic exam, asking about ROM.        HPI:  History of Present Illness    HPI:  Ms. Rivers presents for follow-up after a hip fracture. She reports feeling good overall with occasional discomfort when moving her left leg in certain ways. She experiences intermittent discomfort when moving her left leg in specific positions. She expresses concern about an upcoming pelvic exam, worried about potential discomfort due to the implanted hardware. She can feel the aditya when moving her leg in certain ways, which causes apprehension. She uses a mobility aid for long distances. She also mentions a scheduled colonoscopy. She denies any significant popping or clicking in the hip.    IMAGING:  Ms. Rivers underwent an X-ray of the left hip, which revealed complete healing of a segmental fracture. The fracture line is no longer visible. The physician described the X-rays as "beautiful".      ROS:  Musculoskeletal: +pain with movement, +lower extremity pain with movement           ROS:  Constitutional: Denies fever chills  Eyes: No change in vision  ENT: No ringing or current infections  CV: No chest pain  Resp: No labored breathing  MSK: Pain evident at site of injury located in HPI,   Integ: No signs of abrasions or lacerations  Neuro: No numbness or tingling  Lymphatic: No swelling outside the area of injury     Current Outpatient Medications on File Prior to Visit   Medication Sig Dispense Refill    alendronate (FOSAMAX) 70 MG tablet Take 70 mg by mouth every 7 days.      hydroCHLOROthiazide (HYDRODIURIL) 12.5 MG Tab Take 12.5 mg by mouth.      levothyroxine (SYNTHROID) 112 " "MCG tablet Take 112 mcg by mouth.      rOPINIRole (REQUIP) 3 MG tablet Take 1 tablet by mouth every evening.      simvastatin (ZOCOR) 20 MG tablet Take 20 mg by mouth.      aspirin (ECOTRIN) 81 MG EC tablet Take 1 tablet (81 mg total) by mouth 2 (two) times a day.       No current facility-administered medications on file prior to visit.          Objective:      BP (!) 143/46 (BP Location: Left arm, Patient Position: Sitting)   Pulse (!) 56   Ht 5' 3" (1.6 m)   BMI 23.03 kg/m²   General the patient is alert and oriented x3 no acute distress nontoxic-appearing appropriate affect.    Constitutional: Vital signs are examined and stable.  Resp: No signs of labored breathing      LLE: -Skin:  No pain with range of motion of the hip            -MSK: Hip and Knee F/E, EHL/FHL, Gastroc/Tib anterior Strength 5/5           -Neuro:  Sensation intact to light touch L3-S1 dermatomes           -Lymphatic: No signs of lymphadenopathy           -CV: Capillary refill is less than 2 seconds. DP/PT pulses 2/4. Compartments soft and compressible    Body mass index is 23.03 kg/m².  Patient weight not recorded  No results found for: "HGBA1C"  Hgb   Date Value Ref Range Status   04/12/2025 11.9 (L) 12.0 - 16.0 g/dL Final   11/26/2024 9.4 (L) 11.8 - 16.0 g/dL Final     No results found for: "[id number]"  WBC   Date Value Ref Range Status   04/12/2025 7.30 4.50 - 11.50 x10(3)/mcL Final   11/26/2024 6.04 4.00 - 11.50 x10(3)/mcL Final       Radiology:  Two views left femur skeletally mature individual shows callus formation of the femoral shaft and healing.     Assessment:         1. Closed fracture of left femur with routine healing, unspecified fracture morphology, unspecified portion of femur, subsequent encounter  X-Ray Femur 2 View Left              Plan:         No follow-ups on file.    Elvira was seen today for follow-up.    Diagnoses and all orders for this visit:    Closed fracture of left femur with routine healing, " unspecified fracture morphology, unspecified portion of femur, subsequent encounter  -     X-Ray Femur 2 View Left; Future      Assessment & Plan    PLAN SUMMARY:  - Potential surgery to remove and replace helical blade with a shorter one if issues arise  - Total hip replacement as last resort if other interventions fail  - Follow-up if experiencing popping/clicking in hip, with 1-2 weeks notice to schedule appointment and X-ray    FOLLOW UP:  - Follow up if experiencing popping and clicking in the hip, with 1-2 weeks notice to schedule appointment and X-ray.    PROCEDURES:  - Potential surgery to remove and replace helical blade with a shorter one if patient experiences problems or popping/clicking.  - Total hip replacement as a last resort if other interventions fail to resolve issues.    PATIENT INSTRUCTIONS:  - Avoid falling.          Pt doing well.  Patient not interested in any more surgery at this time.  She is ambulating well.  We will follow up as needed.  All questions answered to best of my ability.    This note/OR report was created with the assistance of  voice recognition software or phone  dictation.  There may be transcription errors as a result of using this technology however minimal. Effort has been made to assure accuracy of transcription but any obvious errors or omissions should be clarified with the author of the document.       Oc Story, DO  Orthopedic Trauma Surgery       No future appointments.

## 2025-06-23 ENCOUNTER — CLINICAL SUPPORT (OUTPATIENT)
Dept: RESPIRATORY THERAPY | Facility: HOSPITAL | Age: 80
End: 2025-06-23
Attending: INTERNAL MEDICINE
Payer: MEDICARE

## 2025-06-23 DIAGNOSIS — Z86.0100 HX OF COLONIC POLYPS: Primary | ICD-10-CM

## 2025-06-23 DIAGNOSIS — Z86.0100 HX OF COLONIC POLYPS: ICD-10-CM

## 2025-06-23 LAB
OHS QRS DURATION: 70 MS
OHS QTC CALCULATION: 376 MS

## 2025-06-23 PROCEDURE — 93010 ELECTROCARDIOGRAM REPORT: CPT | Mod: ,,, | Performed by: STUDENT IN AN ORGANIZED HEALTH CARE EDUCATION/TRAINING PROGRAM

## 2025-06-23 PROCEDURE — 93005 ELECTROCARDIOGRAM TRACING: CPT

## 2025-06-27 ENCOUNTER — HOSPITAL ENCOUNTER (OUTPATIENT)
Dept: RADIOLOGY | Facility: HOSPITAL | Age: 80
Discharge: HOME | End: 2025-06-27
Payer: MEDICARE

## 2025-06-27 DIAGNOSIS — R05.9 COUGH: ICD-10-CM

## 2025-06-27 PROCEDURE — 71046 X-RAY EXAM CHEST 2 VIEWS: CPT | Mod: TC

## 2025-06-30 ENCOUNTER — HOSPITAL ENCOUNTER (OUTPATIENT)
Dept: RADIOLOGY | Facility: HOSPITAL | Age: 80
Discharge: HOME | End: 2025-06-30
Payer: MEDICARE

## 2025-06-30 DIAGNOSIS — J18.9 UNRESOLVED PNEUMONIA: ICD-10-CM

## 2025-06-30 DIAGNOSIS — R91.8 OTHER NONSPECIFIC ABNORMAL FINDING OF LUNG FIELD: ICD-10-CM

## 2025-06-30 DIAGNOSIS — R05.9 COUGH: ICD-10-CM

## 2025-06-30 PROCEDURE — 71046 X-RAY EXAM CHEST 2 VIEWS: CPT | Mod: TC

## (undated) DEVICE — GLOVE PROTEXIS NEU-THERA SZ6

## (undated) DEVICE — SUT CTD VICRYL CT-1 27

## (undated) DEVICE — DRESSING XEROFORM 5X9IN

## (undated) DEVICE — GLOVE SIGNATURE MICRO LTX 6

## (undated) DEVICE — Device

## (undated) DEVICE — COVER TABLE HVY DTY 60X90IN

## (undated) DEVICE — SUT VICRYL PLUS ANTIBACT

## (undated) DEVICE — DRAPE IOBAN 2 STERI

## (undated) DEVICE — GOWN SMARTGOWN 3XL XLONG

## (undated) DEVICE — GLOVE PROTEXIS BLUE LATEX 9

## (undated) DEVICE — WIRE GUIDE 3.2MM 400MM
Type: IMPLANTABLE DEVICE | Site: FEMUR | Status: NON-FUNCTIONAL
Removed: 2024-11-18

## (undated) DEVICE — BIT DRILL 4.2MM 3 FLUTD 145MM

## (undated) DEVICE — DRESSING TELFA + BARR 4X6IN

## (undated) DEVICE — TAPE SILK 3IN

## (undated) DEVICE — SUT MCRYL PLUS 2-0 CT-1 36IN

## (undated) DEVICE — STAPLER SKIN PROXIMATE WIDE

## (undated) DEVICE — DRAPE C-ARMOR EQUIPMENT COVER

## (undated) DEVICE — IMPLANTABLE DEVICE
Type: IMPLANTABLE DEVICE | Site: FEMUR | Status: NON-FUNCTIONAL
Removed: 2024-11-18

## (undated) DEVICE — COVER FULLGUARD SHOE HIGH-TOP

## (undated) DEVICE — ELECTRODE REM POLYHESIVE II

## (undated) DEVICE — GLOVE PROTEXIS HYDROGEL SZ9

## (undated) DEVICE — APPLICATOR CHLORAPREP ORN 26ML

## (undated) DEVICE — SPONGE COTTON TRAY 4X4IN

## (undated) DEVICE — BLADE SURG CARBON STEEL #10